# Patient Record
Sex: FEMALE | Race: BLACK OR AFRICAN AMERICAN | NOT HISPANIC OR LATINO | Employment: FULL TIME | URBAN - METROPOLITAN AREA
[De-identification: names, ages, dates, MRNs, and addresses within clinical notes are randomized per-mention and may not be internally consistent; named-entity substitution may affect disease eponyms.]

---

## 2023-02-12 PROBLEM — B37.31 YEAST INFECTION OF THE VAGINA: Status: ACTIVE | Noted: 2023-02-12

## 2023-02-12 PROBLEM — I10 HTN (HYPERTENSION): Status: ACTIVE | Noted: 2023-02-12

## 2023-02-12 PROBLEM — R39.9 UTI SYMPTOMS: Status: ACTIVE | Noted: 2023-02-12

## 2023-02-12 PROBLEM — S16.1XXA CERVICAL STRAIN: Status: ACTIVE | Noted: 2023-02-12

## 2023-02-12 PROBLEM — N76.0 BACTERIAL VAGINOSIS: Status: ACTIVE | Noted: 2023-02-12

## 2023-02-12 PROBLEM — H10.13 ACUTE ALLERGIC CONJUNCTIVITIS OF BOTH EYES: Status: ACTIVE | Noted: 2023-02-12

## 2023-02-12 PROBLEM — R10.2 PELVIC AND PERINEAL PAIN: Status: ACTIVE | Noted: 2023-02-12

## 2023-02-12 PROBLEM — N89.8 VAGINAL ITCHING: Status: ACTIVE | Noted: 2023-02-12

## 2023-02-12 PROBLEM — R63.2 POLYPHAGIA: Status: ACTIVE | Noted: 2023-02-12

## 2023-02-12 PROBLEM — H10.13 ALLERGIC CONJUNCTIVITIS, BILATERAL: Status: ACTIVE | Noted: 2023-02-12

## 2023-02-12 PROBLEM — E66.811 CLASS 1 OBESITY WITHOUT SERIOUS COMORBIDITY WITH BODY MASS INDEX (BMI) OF 34.0 TO 34.9 IN ADULT: Status: ACTIVE | Noted: 2023-02-12

## 2023-02-12 PROBLEM — B96.89 BACTERIAL VAGINOSIS: Status: ACTIVE | Noted: 2023-02-12

## 2023-02-12 PROBLEM — M25.531 RIGHT WRIST PAIN: Status: ACTIVE | Noted: 2023-02-12

## 2023-02-12 PROBLEM — E88.810 METABOLIC SYNDROME: Status: ACTIVE | Noted: 2023-02-12

## 2023-02-12 PROBLEM — N73.0 PID (ACUTE PELVIC INFLAMMATORY DISEASE): Status: ACTIVE | Noted: 2023-02-12

## 2023-02-12 PROBLEM — R39.89 POSSIBLE URINARY TRACT INFECTION: Status: ACTIVE | Noted: 2023-02-12

## 2023-02-12 PROBLEM — M62.89 HIGH-TONE PELVIC FLOOR DYSFUNCTION IN FEMALE: Status: ACTIVE | Noted: 2023-02-12

## 2023-02-12 PROBLEM — N39.0 ACUTE UTI (URINARY TRACT INFECTION): Status: ACTIVE | Noted: 2023-02-12

## 2023-02-12 PROBLEM — H10.33 ACUTE BACTERIAL CONJUNCTIVITIS OF BOTH EYES: Status: ACTIVE | Noted: 2023-02-12

## 2023-02-12 PROBLEM — R20.0 NUMBNESS OF ARM: Status: ACTIVE | Noted: 2023-02-12

## 2023-02-12 PROBLEM — R20.2 RIGHT HAND PARESTHESIA: Status: ACTIVE | Noted: 2023-02-12

## 2023-02-12 PROBLEM — N76.0 RECURRENT VAGINITIS: Status: ACTIVE | Noted: 2023-02-12

## 2023-02-12 PROBLEM — N76.1 SUBACUTE AND CHRONIC VAGINITIS: Status: ACTIVE | Noted: 2023-02-12

## 2023-02-12 PROBLEM — M54.50 LOWER BACK PAIN: Status: ACTIVE | Noted: 2023-02-12

## 2023-02-12 PROBLEM — N89.8 VAGINAL DISCHARGE: Status: ACTIVE | Noted: 2023-02-12

## 2023-02-12 PROBLEM — E66.9 OBESITY: Status: ACTIVE | Noted: 2023-02-12

## 2023-02-12 PROBLEM — E55.9 VITAMIN D DEFICIENCY: Status: ACTIVE | Noted: 2023-02-12

## 2023-02-12 RX ORDER — BUPROPION HYDROCHLORIDE 150 MG/1
TABLET ORAL
COMMUNITY
End: 2023-11-14 | Stop reason: ENTERED-IN-ERROR

## 2023-02-12 RX ORDER — METFORMIN HYDROCHLORIDE 500 MG/1
1500 TABLET ORAL
COMMUNITY
End: 2023-11-14 | Stop reason: SDUPTHER

## 2023-02-12 RX ORDER — ACETAMINOPHEN 500 MG
1 TABLET ORAL DAILY
COMMUNITY
Start: 2022-06-12 | End: 2023-03-30

## 2023-02-12 RX ORDER — AMOXICILLIN 500 MG/1
500 TABLET, FILM COATED ORAL 3 TIMES DAILY
COMMUNITY
End: 2023-03-16 | Stop reason: ALTCHOICE

## 2023-02-12 RX ORDER — AMLODIPINE BESYLATE 10 MG/1
1 TABLET ORAL DAILY
COMMUNITY
Start: 2020-04-23 | End: 2023-08-27

## 2023-02-12 RX ORDER — METHOCARBAMOL 500 MG/1
TABLET, FILM COATED ORAL
COMMUNITY
End: 2023-11-14 | Stop reason: ALTCHOICE

## 2023-02-12 RX ORDER — NORETHINDRONE 5 MG/1
1 TABLET ORAL
COMMUNITY
Start: 2022-02-07 | End: 2023-11-14 | Stop reason: ALTCHOICE

## 2023-02-12 RX ORDER — OFLOXACIN 3 MG/ML
SOLUTION AURICULAR (OTIC)
COMMUNITY
End: 2023-03-16 | Stop reason: ALTCHOICE

## 2023-02-12 RX ORDER — PENICILLIN V POTASSIUM 500 MG/1
500 TABLET, FILM COATED ORAL 4 TIMES DAILY
COMMUNITY
End: 2023-03-16 | Stop reason: ALTCHOICE

## 2023-02-12 RX ORDER — HYDROXYZINE PAMOATE 25 MG/1
CAPSULE ORAL
COMMUNITY
End: 2023-03-16 | Stop reason: ALTCHOICE

## 2023-02-12 RX ORDER — IBUPROFEN 600 MG/1
600 TABLET ORAL EVERY 8 HOURS PRN
COMMUNITY
End: 2023-03-16 | Stop reason: ALTCHOICE

## 2023-02-12 RX ORDER — HYDROCHLOROTHIAZIDE 12.5 MG/1
1 TABLET ORAL DAILY
COMMUNITY
Start: 2020-04-23 | End: 2023-08-27

## 2023-03-16 ENCOUNTER — OFFICE VISIT (OUTPATIENT)
Dept: PRIMARY CARE | Facility: CLINIC | Age: 43
End: 2023-03-16
Payer: COMMERCIAL

## 2023-03-16 VITALS
HEIGHT: 67 IN | BODY MASS INDEX: 32.13 KG/M2 | SYSTOLIC BLOOD PRESSURE: 126 MMHG | DIASTOLIC BLOOD PRESSURE: 78 MMHG | HEART RATE: 71 BPM | OXYGEN SATURATION: 98 % | WEIGHT: 204.7 LBS | TEMPERATURE: 97 F | RESPIRATION RATE: 18 BRPM

## 2023-03-16 DIAGNOSIS — E66.9 CLASS 1 OBESITY WITH BODY MASS INDEX (BMI) OF 32.0 TO 32.9 IN ADULT, UNSPECIFIED OBESITY TYPE, UNSPECIFIED WHETHER SERIOUS COMORBIDITY PRESENT: ICD-10-CM

## 2023-03-16 DIAGNOSIS — Z00.00 ROUTINE HISTORY AND PHYSICAL EXAMINATION OF ADULT: Primary | ICD-10-CM

## 2023-03-16 LAB
ALANINE AMINOTRANSFERASE (SGPT) (U/L) IN SER/PLAS: 21 U/L (ref 7–45)
ALBUMIN (G/DL) IN SER/PLAS: 4.6 G/DL (ref 3.4–5)
ALKALINE PHOSPHATASE (U/L) IN SER/PLAS: 57 U/L (ref 33–110)
ANION GAP IN SER/PLAS: 14 MMOL/L (ref 10–20)
ASPARTATE AMINOTRANSFERASE (SGOT) (U/L) IN SER/PLAS: 21 U/L (ref 9–39)
BILIRUBIN TOTAL (MG/DL) IN SER/PLAS: 0.6 MG/DL (ref 0–1.2)
CALCIDIOL (25 OH VITAMIN D3) (NG/ML) IN SER/PLAS: 46 NG/ML
CALCIUM (MG/DL) IN SER/PLAS: 9.8 MG/DL (ref 8.6–10.6)
CARBON DIOXIDE, TOTAL (MMOL/L) IN SER/PLAS: 29 MMOL/L (ref 21–32)
CHLORIDE (MMOL/L) IN SER/PLAS: 102 MMOL/L (ref 98–107)
CREATININE (MG/DL) IN SER/PLAS: 0.81 MG/DL (ref 0.5–1.05)
GFR FEMALE: >90 ML/MIN/1.73M2
GLUCOSE (MG/DL) IN SER/PLAS: 100 MG/DL (ref 74–99)
HEPATITIS B VIRUS SURFACE AG PRESENCE IN SERUM: NONREACTIVE
HEPATITIS C VIRUS AB PRESENCE IN SERUM: NONREACTIVE
HIV 1/ 2 AG/AB SCREEN: NONREACTIVE
INSULIN, FASTING: 10 UIU/ML (ref 3–25)
POTASSIUM (MMOL/L) IN SER/PLAS: 3.7 MMOL/L (ref 3.5–5.3)
PROTEIN TOTAL: 7.6 G/DL (ref 6.4–8.2)
SODIUM (MMOL/L) IN SER/PLAS: 141 MMOL/L (ref 136–145)
SYPHILIS TOTAL AB: NONREACTIVE
UREA NITROGEN (MG/DL) IN SER/PLAS: 11 MG/DL (ref 6–23)

## 2023-03-16 PROCEDURE — 1036F TOBACCO NON-USER: CPT | Performed by: INTERNAL MEDICINE

## 2023-03-16 PROCEDURE — 99396 PREV VISIT EST AGE 40-64: CPT | Performed by: INTERNAL MEDICINE

## 2023-03-16 PROCEDURE — 3008F BODY MASS INDEX DOCD: CPT | Performed by: INTERNAL MEDICINE

## 2023-03-16 PROCEDURE — 3074F SYST BP LT 130 MM HG: CPT | Performed by: INTERNAL MEDICINE

## 2023-03-16 PROCEDURE — 3078F DIAST BP <80 MM HG: CPT | Performed by: INTERNAL MEDICINE

## 2023-03-16 RX ORDER — OXYCODONE AND ACETAMINOPHEN 5; 325 MG/1; MG/1
TABLET ORAL EVERY 6 HOURS
COMMUNITY
Start: 2016-08-24 | End: 2023-03-16 | Stop reason: ALTCHOICE

## 2023-03-16 ASSESSMENT — ENCOUNTER SYMPTOMS
DIZZINESS: 0
HEADACHES: 0
NUMBNESS: 0
ABDOMINAL PAIN: 0
SORE THROAT: 0
BACK PAIN: 1
CONSTITUTIONAL NEGATIVE: 1
WEAKNESS: 0
WHEEZING: 0
ARTHRALGIAS: 0
DIFFICULTY URINATING: 0
ADENOPATHY: 0
PALPITATIONS: 0
RHINORRHEA: 0
VOMITING: 0
SHORTNESS OF BREATH: 0
JOINT SWELLING: 0
EYES NEGATIVE: 1
BLOOD IN STOOL: 0
CONSTIPATION: 0
BRUISES/BLEEDS EASILY: 0
PSYCHIATRIC NEGATIVE: 1
WOUND: 0
CHEST TIGHTNESS: 0
DYSURIA: 0
NAUSEA: 0
COUGH: 0
POLYPHAGIA: 0
POLYDIPSIA: 0
FREQUENCY: 0

## 2023-03-16 NOTE — PATIENT INSTRUCTIONS
You were seen today for a physical.  Your vital signs are in the normal range.  Fasting labs were ordered today as part of your evaluation.  Continue to exercise regularly as tolerated, eat a heart healthy diet and resume weight reduction.  It was a pleasure seeing you today.

## 2023-03-16 NOTE — PROGRESS NOTES
Subjective   Patient ID: Yenni Suero is a 42 y.o. female who presents for Annual Exam.  The patient is a 41 YO female who is being seen today for a physical.      Review of Systems   Constitutional: Negative.    HENT: Negative.  Negative for ear pain, hearing loss, rhinorrhea, sneezing and sore throat.    Eyes: Negative.    Respiratory:  Negative for cough, chest tightness, shortness of breath and wheezing.    Cardiovascular:  Negative for chest pain, palpitations and leg swelling.   Gastrointestinal:  Negative for abdominal pain, blood in stool, constipation, nausea and vomiting.   Endocrine: Negative for polydipsia, polyphagia and polyuria.   Genitourinary:  Negative for difficulty urinating, dysuria, frequency, vaginal bleeding, vaginal discharge and vaginal pain.   Musculoskeletal:  Positive for back pain (Hx of chronic back pain). Negative for arthralgias and joint swelling.   Skin:  Positive for rash (Eczema of legs). Negative for wound.   Allergic/Immunologic: Positive for food allergies. Negative for environmental allergies.   Neurological:  Negative for dizziness, syncope, weakness, numbness and headaches.   Hematological:  Negative for adenopathy. Does not bruise/bleed easily.   Psychiatric/Behavioral: Negative.         Objective   Physical Exam  Vitals reviewed.   Constitutional:       General: She is not in acute distress.     Appearance: Normal appearance. She is obese.   HENT:      Head: Normocephalic.      Right Ear: Tympanic membrane, ear canal and external ear normal.      Left Ear: Tympanic membrane, ear canal and external ear normal.      Nose: Nose normal. No rhinorrhea.      Mouth/Throat:      Mouth: Mucous membranes are moist.      Pharynx: Oropharynx is clear. No oropharyngeal exudate or posterior oropharyngeal erythema.   Eyes:      General: Scleral icterus present.      Extraocular Movements: Extraocular movements intact.      Conjunctiva/sclera: Conjunctivae normal.      Pupils:  Pupils are equal, round, and reactive to light.   Cardiovascular:      Rate and Rhythm: Normal rate and regular rhythm.      Heart sounds: Normal heart sounds. No murmur heard.     No friction rub. No gallop.   Pulmonary:      Effort: Pulmonary effort is normal.      Breath sounds: Normal breath sounds.   Abdominal:      General: Bowel sounds are normal. There is no distension.      Palpations: Abdomen is soft. There is no mass.      Tenderness: There is no abdominal tenderness.   Musculoskeletal:      Cervical back: No rigidity or tenderness.   Lymphadenopathy:      Cervical: No cervical adenopathy.   Skin:     General: Skin is warm and dry.      Findings: No erythema or rash.   Neurological:      General: No focal deficit present.      Mental Status: She is alert and oriented to person, place, and time.      Sensory: No sensory deficit.      Motor: No weakness.   Psychiatric:         Mood and Affect: Mood normal.         Behavior: Behavior normal.       Assessment/Plan   Problem List Items Addressed This Visit    None  Visit Diagnoses       Routine history and physical examination of adult    -  Primary    Relevant Orders    Lipid Panel    CBC    Class 1 obesity with body mass index (BMI) of 32.0 to 32.9 in adult, unspecified obesity type, unspecified whether serious comorbidity present        Relevant Orders    Hemoglobin A1C    Follow Up In [facility] - PCP

## 2023-03-21 ENCOUNTER — LAB (OUTPATIENT)
Dept: LAB | Facility: LAB | Age: 43
End: 2023-03-21
Payer: COMMERCIAL

## 2023-03-21 ENCOUNTER — OFFICE VISIT (OUTPATIENT)
Dept: PRIMARY CARE | Facility: CLINIC | Age: 43
End: 2023-03-21
Payer: COMMERCIAL

## 2023-03-21 VITALS
HEART RATE: 64 BPM | WEIGHT: 207.8 LBS | DIASTOLIC BLOOD PRESSURE: 91 MMHG | BODY MASS INDEX: 32.62 KG/M2 | HEIGHT: 67 IN | OXYGEN SATURATION: 98 % | TEMPERATURE: 97.2 F | SYSTOLIC BLOOD PRESSURE: 148 MMHG

## 2023-03-21 DIAGNOSIS — E66.9 CLASS 1 OBESITY WITH BODY MASS INDEX (BMI) OF 32.0 TO 32.9 IN ADULT, UNSPECIFIED OBESITY TYPE, UNSPECIFIED WHETHER SERIOUS COMORBIDITY PRESENT: ICD-10-CM

## 2023-03-21 DIAGNOSIS — Z00.00 ROUTINE HISTORY AND PHYSICAL EXAMINATION OF ADULT: ICD-10-CM

## 2023-03-21 DIAGNOSIS — B37.31 YEAST INFECTION OF THE VAGINA: Primary | ICD-10-CM

## 2023-03-21 DIAGNOSIS — M54.50 LUMBAR BACK PAIN: ICD-10-CM

## 2023-03-21 LAB
CHOLESTEROL (MG/DL) IN SER/PLAS: 148 MG/DL (ref 0–199)
CHOLESTEROL IN HDL (MG/DL) IN SER/PLAS: 55.5 MG/DL
CHOLESTEROL/HDL RATIO: 2.7
ERYTHROCYTE DISTRIBUTION WIDTH (RATIO) BY AUTOMATED COUNT: 11.9 % (ref 11.5–14.5)
ERYTHROCYTE MEAN CORPUSCULAR HEMOGLOBIN CONCENTRATION (G/DL) BY AUTOMATED: 33.1 G/DL (ref 32–36)
ERYTHROCYTE MEAN CORPUSCULAR VOLUME (FL) BY AUTOMATED COUNT: 91 FL (ref 80–100)
ERYTHROCYTES (10*6/UL) IN BLOOD BY AUTOMATED COUNT: 4.47 X10E12/L (ref 4–5.2)
ESTIMATED AVERAGE GLUCOSE FOR HBA1C: 105 MG/DL
HEMATOCRIT (%) IN BLOOD BY AUTOMATED COUNT: 40.5 % (ref 36–46)
HEMOGLOBIN (G/DL) IN BLOOD: 13.4 G/DL (ref 12–16)
HEMOGLOBIN A1C/HEMOGLOBIN TOTAL IN BLOOD: 5.3 %
LDL: 75 MG/DL (ref 0–99)
LEUKOCYTES (10*3/UL) IN BLOOD BY AUTOMATED COUNT: 7.2 X10E9/L (ref 4.4–11.3)
NRBC (PER 100 WBCS) BY AUTOMATED COUNT: 0 /100 WBC (ref 0–0)
PLATELETS (10*3/UL) IN BLOOD AUTOMATED COUNT: 358 X10E9/L (ref 150–450)
TRIGLYCERIDE (MG/DL) IN SER/PLAS: 86 MG/DL (ref 0–149)
VLDL: 17 MG/DL (ref 0–40)

## 2023-03-21 PROCEDURE — 80061 LIPID PANEL: CPT

## 2023-03-21 PROCEDURE — 85027 COMPLETE CBC AUTOMATED: CPT

## 2023-03-21 PROCEDURE — 1036F TOBACCO NON-USER: CPT | Performed by: STUDENT IN AN ORGANIZED HEALTH CARE EDUCATION/TRAINING PROGRAM

## 2023-03-21 PROCEDURE — 36415 COLL VENOUS BLD VENIPUNCTURE: CPT

## 2023-03-21 PROCEDURE — 99214 OFFICE O/P EST MOD 30 MIN: CPT | Performed by: STUDENT IN AN ORGANIZED HEALTH CARE EDUCATION/TRAINING PROGRAM

## 2023-03-21 PROCEDURE — 3008F BODY MASS INDEX DOCD: CPT | Performed by: STUDENT IN AN ORGANIZED HEALTH CARE EDUCATION/TRAINING PROGRAM

## 2023-03-21 PROCEDURE — 83036 HEMOGLOBIN GLYCOSYLATED A1C: CPT

## 2023-03-21 PROCEDURE — 3077F SYST BP >= 140 MM HG: CPT | Performed by: STUDENT IN AN ORGANIZED HEALTH CARE EDUCATION/TRAINING PROGRAM

## 2023-03-21 PROCEDURE — 3080F DIAST BP >= 90 MM HG: CPT | Performed by: STUDENT IN AN ORGANIZED HEALTH CARE EDUCATION/TRAINING PROGRAM

## 2023-03-21 RX ORDER — FLUCONAZOLE 150 MG/1
150 TABLET ORAL ONCE
Qty: 1 TABLET | Refills: 0 | Status: SHIPPED | OUTPATIENT
Start: 2023-03-21 | End: 2023-03-21

## 2023-03-21 RX ORDER — GABAPENTIN 100 MG/1
100 CAPSULE ORAL NIGHTLY
Qty: 90 CAPSULE | Refills: 0 | Status: SHIPPED | OUTPATIENT
Start: 2023-03-21 | End: 2023-11-14 | Stop reason: ALTCHOICE

## 2023-03-21 ASSESSMENT — ENCOUNTER SYMPTOMS
BACK PAIN: 1
PARESIS: 0
BOWEL INCONTINENCE: 0
PARESTHESIAS: 0
TINGLING: 1
NUMBNESS: 0

## 2023-03-21 NOTE — PROGRESS NOTES
Subjective   Patient ID: Yenni Suero is a 42 y.o. female who presents for Follow-up (Back pain ) and Vaginitis/Bacterial Vaginosis (Recent abx use ).  Back Pain  This is a chronic problem. The current episode started more than 1 year ago. The problem occurs daily. The problem has been gradually worsening (for the past 3 weeks getting worse) since onset. The pain is present in the gluteal and sacro-iliac. The pain is severe. Worse during: worse when wakes up in the AM. The symptoms are aggravated by lying down. Associated symptoms include tingling (fingers and toes for the past year). Pertinent negatives include no bladder incontinence, bowel incontinence, numbness, paresis or paresthesias. She has tried NSAIDs, heat and analgesics for the symptoms. The treatment provided no relief.   Vaginal Itching  The patient's primary symptoms include genital itching and vaginal discharge (white). The patient's pertinent negatives include no genital lesions or genital odor. This is a new problem. The current episode started in the past 7 days. Associated symptoms include back pain. The vaginal discharge was thick and white. The vaginal bleeding is spotting. She has not been passing clots. She has not been passing tissue. She is not sexually active. Her past medical history is significant for endometriosis.       Review of Systems   Gastrointestinal:  Negative for bowel incontinence.   Genitourinary:  Positive for vaginal discharge (white). Negative for bladder incontinence.   Musculoskeletal:  Positive for back pain.   Neurological:  Positive for tingling (fingers and toes for the past year). Negative for numbness and paresthesias.       Visit Vitals  BP (!) 148/91   Pulse 64   Temp 36.2 °C (97.2 °F)          Objective   Physical Exam  Constitutional:       General: She is not in acute distress.     Appearance: Normal appearance.   HENT:      Head: Normocephalic and atraumatic.   Eyes:      General: No scleral icterus.      Conjunctiva/sclera: Conjunctivae normal.   Cardiovascular:      Rate and Rhythm: Normal rate and regular rhythm.      Heart sounds: Normal heart sounds.   Pulmonary:      Effort: Pulmonary effort is normal.      Breath sounds: Normal breath sounds. No wheezing.   Abdominal:      General: Bowel sounds are normal. There is no distension.      Palpations: Abdomen is soft.      Tenderness: There is no abdominal tenderness.   Musculoskeletal:         General: Tenderness (R lumbosacral area) present.      Cervical back: Neck supple.      Right lower leg: No edema.      Left lower leg: No edema.   Lymphadenopathy:      Cervical: No cervical adenopathy.   Skin:     General: Skin is warm and dry.   Neurological:      General: No focal deficit present.      Mental Status: She is alert and oriented to person, place, and time.   Psychiatric:         Mood and Affect: Mood normal.         Behavior: Behavior normal.         Assessment/Plan   Problem List Items Addressed This Visit          Genitourinary    Yeast infection of the vagina - Primary     Diflucan   See your OBGYN          Relevant Medications    fluconazole (Diflucan) 150 mg tablet       Other    Lumbar back pain     X-ray of lumbar spine  Gabapentin started 100 mg at bedtime may increase to 200 mg at bedtime after 3 to 4 days if the pain not controlled.  Can take up to 300 mg nightly  Referral to physical therapy         Relevant Medications    gabapentin (Neurontin) 100 mg capsule    Other Relevant Orders    Referral to Physical Therapy    XR lumbar spine complete 4+ views    Follow Up In [facility] - PCP

## 2023-03-21 NOTE — PATIENT INSTRUCTIONS
Continue with current medications.  X-ray of lumbar spine  Gabapentin started 100 mg at bedtime may increase to 200 mg at bedtime after 3 to 4 days if the pain not controlled.  Can take up to 300 mg nightly  Referral to physical therapy  Referral to pain management if no improvement  All the nonurgent lab results will be discussed with you at your next office visit.  Please arrive 15 minutes before your appointment.   Return to office in 1 months with PCP for back pain

## 2023-03-21 NOTE — ASSESSMENT & PLAN NOTE
X-ray of lumbar spine  Gabapentin started 100 mg at bedtime may increase to 200 mg at bedtime after 3 to 4 days if the pain not controlled.  Can take up to 300 mg nightly  Referral to physical therapy

## 2023-03-29 DIAGNOSIS — E55.9 VITAMIN D DEFICIENCY, UNSPECIFIED: ICD-10-CM

## 2023-03-30 ENCOUNTER — TELEPHONE (OUTPATIENT)
Dept: PRIMARY CARE | Facility: CLINIC | Age: 43
End: 2023-03-30
Payer: COMMERCIAL

## 2023-03-30 RX ORDER — ACETAMINOPHEN 500 MG
TABLET ORAL
Qty: 90 CAPSULE | Refills: 2 | Status: SHIPPED | OUTPATIENT
Start: 2023-03-30

## 2023-03-30 NOTE — TELEPHONE ENCOUNTER
----- Message from Peyton Dobson MD sent at 3/27/2023  4:52 PM EDT -----  Please call the patient regarding her abnormal result.  X-ray of the back showed some degenerative joint disease in the lower spine.  I had previously referred her to physical therapy.  If she is not getting any relief with physical therapy we can always refer her to pain management or physical medicine and rehab .

## 2023-06-22 LAB
CHLAMYDIA TRACH., AMPLIFIED: NEGATIVE
CLUE CELLS: ABNORMAL
N. GONORRHEA, AMPLIFIED: NEGATIVE
NUGENT SCORE: 0
TRICHOMONAS VAGINALIS: NEGATIVE
YEAST: PRESENT

## 2023-08-20 DIAGNOSIS — Z91.018 FOOD ALLERGY: Primary | ICD-10-CM

## 2023-08-20 RX ORDER — EPINEPHRINE 0.15 MG/.15ML
0.15 INJECTION SUBCUTANEOUS ONCE AS NEEDED
Qty: 1 EACH | Refills: 2 | Status: SHIPPED | OUTPATIENT
Start: 2023-08-20 | End: 2023-08-23

## 2023-08-21 DIAGNOSIS — Z91.018 FOOD ALLERGY: ICD-10-CM

## 2023-08-21 DIAGNOSIS — I10 HYPERTENSION, UNSPECIFIED TYPE: Primary | ICD-10-CM

## 2023-08-21 NOTE — TELEPHONE ENCOUNTER
Leo from [facility] stated the dose for the Epipen is for children. Please, prescribe adult Epipen. Leo stated Dr. Dobson is the prescribing doctor.

## 2023-08-23 RX ORDER — EPINEPHRINE 0.15 MG/.15ML
0.15 INJECTION SUBCUTANEOUS ONCE AS NEEDED
Qty: 2 EACH | Refills: 2 | Status: SHIPPED | OUTPATIENT
Start: 2023-08-23 | End: 2023-08-27

## 2023-08-24 DIAGNOSIS — Z91.018 FOOD ALLERGY: ICD-10-CM

## 2023-08-27 RX ORDER — EPINEPHRINE 0.15 MG/.15ML
0.15 INJECTION SUBCUTANEOUS ONCE AS NEEDED
Qty: 2 EACH | Refills: 2 | Status: SHIPPED | OUTPATIENT
Start: 2023-08-27 | End: 2024-03-25 | Stop reason: WASHOUT

## 2023-08-27 RX ORDER — AMLODIPINE BESYLATE 10 MG/1
10 TABLET ORAL DAILY
Qty: 90 TABLET | Refills: 1 | Status: SHIPPED | OUTPATIENT
Start: 2023-08-27

## 2023-08-27 RX ORDER — HYDROCHLOROTHIAZIDE 12.5 MG/1
12.5 TABLET ORAL DAILY
Qty: 90 TABLET | Refills: 1 | Status: SHIPPED | OUTPATIENT
Start: 2023-08-27

## 2023-11-14 ENCOUNTER — OFFICE VISIT (OUTPATIENT)
Dept: PRIMARY CARE | Facility: CLINIC | Age: 43
End: 2023-11-14
Payer: COMMERCIAL

## 2023-11-14 VITALS
TEMPERATURE: 94.3 F | OXYGEN SATURATION: 100 % | DIASTOLIC BLOOD PRESSURE: 80 MMHG | SYSTOLIC BLOOD PRESSURE: 118 MMHG | WEIGHT: 199.2 LBS | RESPIRATION RATE: 14 BRPM | HEART RATE: 98 BPM | HEIGHT: 67 IN | BODY MASS INDEX: 31.27 KG/M2

## 2023-11-14 DIAGNOSIS — Z13.39 ATTENTION DEFICIT HYPERACTIVITY DISORDER (ADHD) EVALUATION: Primary | ICD-10-CM

## 2023-11-14 DIAGNOSIS — E66.9 CLASS 1 OBESITY WITH SERIOUS COMORBIDITY AND BODY MASS INDEX (BMI) OF 31.0 TO 31.9 IN ADULT, UNSPECIFIED OBESITY TYPE: ICD-10-CM

## 2023-11-14 DIAGNOSIS — Z12.11 SCREENING FOR COLORECTAL CANCER: ICD-10-CM

## 2023-11-14 DIAGNOSIS — Z12.12 SCREENING FOR COLORECTAL CANCER: ICD-10-CM

## 2023-11-14 DIAGNOSIS — E88.810 METABOLIC SYNDROME: ICD-10-CM

## 2023-11-14 DIAGNOSIS — R63.2 POLYPHAGIA: ICD-10-CM

## 2023-11-14 PROBLEM — N89.8 VAGINAL ITCHING: Status: RESOLVED | Noted: 2023-02-12 | Resolved: 2023-11-14

## 2023-11-14 PROBLEM — R39.9 UTI SYMPTOMS: Status: RESOLVED | Noted: 2023-02-12 | Resolved: 2023-11-14

## 2023-11-14 PROBLEM — R39.89 POSSIBLE URINARY TRACT INFECTION: Status: RESOLVED | Noted: 2023-02-12 | Resolved: 2023-11-14

## 2023-11-14 PROCEDURE — 99214 OFFICE O/P EST MOD 30 MIN: CPT | Performed by: STUDENT IN AN ORGANIZED HEALTH CARE EDUCATION/TRAINING PROGRAM

## 2023-11-14 PROCEDURE — 3079F DIAST BP 80-89 MM HG: CPT | Performed by: STUDENT IN AN ORGANIZED HEALTH CARE EDUCATION/TRAINING PROGRAM

## 2023-11-14 PROCEDURE — 1036F TOBACCO NON-USER: CPT | Performed by: STUDENT IN AN ORGANIZED HEALTH CARE EDUCATION/TRAINING PROGRAM

## 2023-11-14 PROCEDURE — 3074F SYST BP LT 130 MM HG: CPT | Performed by: STUDENT IN AN ORGANIZED HEALTH CARE EDUCATION/TRAINING PROGRAM

## 2023-11-14 PROCEDURE — 3008F BODY MASS INDEX DOCD: CPT | Performed by: STUDENT IN AN ORGANIZED HEALTH CARE EDUCATION/TRAINING PROGRAM

## 2023-11-14 RX ORDER — METFORMIN HYDROCHLORIDE 500 MG/1
1500 TABLET ORAL
Status: CANCELLED | OUTPATIENT
Start: 2023-11-14

## 2023-11-14 RX ORDER — BUPROPION HYDROCHLORIDE 300 MG/1
300 TABLET ORAL DAILY
COMMUNITY

## 2023-11-14 RX ORDER — METFORMIN HYDROCHLORIDE 500 MG/1
1500 TABLET ORAL
Qty: 270 TABLET | Refills: 1 | Status: SHIPPED | OUTPATIENT
Start: 2023-11-14 | End: 2024-05-12

## 2023-11-14 RX ORDER — ELAGOLIX 150 MG/1
150 TABLET, FILM COATED ORAL DAILY
COMMUNITY
Start: 2023-08-22

## 2023-11-14 ASSESSMENT — PATIENT HEALTH QUESTIONNAIRE - PHQ9
SUM OF ALL RESPONSES TO PHQ9 QUESTIONS 1 & 2: 0
1. LITTLE INTEREST OR PLEASURE IN DOING THINGS: NOT AT ALL
2. FEELING DOWN, DEPRESSED OR HOPELESS: NOT AT ALL

## 2023-11-14 NOTE — PATIENT INSTRUCTIONS
BMI was above normal measurement. Current weight: 90.4 kg (199 lb 3.2 oz)  Weight change since last visit (-) denotes wt loss -6.8 lbs   Weight loss needed to achieve BMI 25: 39.9 Lbs  Weight loss needed to achieve BMI 30: 8.1 Lbs  Provided instructions on dietary changes  Provided instructions on exercise  Advised to Increase physical activity.  Blood work today  Referral to psychiatry to Attention Deficit evaluation   Please follow up in 3-4 months or as needed

## 2023-11-14 NOTE — PROGRESS NOTES
Subjective   Patient ID: Yenni Suero is a pleasant 43 y.o. female who presents for concentration .  HPI  She goes to school and she has trouble with concentration.   Has been having this problem for her whole life   Never checked for ADD or ADHD  Also has noted difficulty with concentration with tasks that are not related to her schoolwork.    She was previously seeing a weight loss  at [facility] and was started on metformin and Mounjaro.  Noted some weight loss with Mounjaro.  Reports that as her insurance changed it was no longer covered with her insurance.  We had a long discussion with regards to lifestyle modification, intermittent fasting, exercise, monitoring her diet and adhering to a low calorie diet.  In the meantime we will complete blood work and to check for A1c, insulin level and CMP.    Review of Systems   All other systems reviewed and are negative.      Visit Vitals  /80   Pulse 98   Temp 34.6 °C (94.3 °F)   Resp 14          Objective   Physical Exam  Constitutional:       General: She is not in acute distress.     Appearance: Normal appearance.   HENT:      Head: Normocephalic and atraumatic.   Eyes:      General: No scleral icterus.     Conjunctiva/sclera: Conjunctivae normal.   Cardiovascular:      Rate and Rhythm: Normal rate and regular rhythm.      Heart sounds: Normal heart sounds.   Pulmonary:      Effort: Pulmonary effort is normal.      Breath sounds: Normal breath sounds. No wheezing.   Abdominal:      General: Bowel sounds are normal. There is no distension.      Palpations: Abdomen is soft.      Tenderness: There is no abdominal tenderness.   Musculoskeletal:      Cervical back: Neck supple.      Right lower leg: No edema.      Left lower leg: No edema.   Lymphadenopathy:      Cervical: No cervical adenopathy.   Skin:     General: Skin is warm and dry.   Neurological:      General: No focal deficit present.      Mental Status: She is alert and  oriented to person, place, and time.   Psychiatric:         Mood and Affect: Mood normal.         Behavior: Behavior normal.         Assessment/Plan   Problem List Items Addressed This Visit       Metabolic syndrome    Relevant Medications    metFORMIN (Glucophage) 500 mg tablet    Other Relevant Orders    Hemoglobin A1C    Insulin, Random    Comprehensive Metabolic Panel    Polyphagia    Relevant Orders    Hemoglobin A1C    Insulin, Random    Comprehensive Metabolic Panel     Other Visit Diagnoses       Attention deficit hyperactivity disorder (ADHD) evaluation    -  Primary    Relevant Orders    Referral to Psychiatry    Class 1 obesity with serious comorbidity and body mass index (BMI) of 31.0 to 31.9 in adult, unspecified obesity type        Relevant Orders    Hemoglobin A1C    Insulin, Random    Comprehensive Metabolic Panel    Screening for colorectal cancer        Relevant Orders    Colonoscopy Screening; Average Risk Patient

## 2024-01-02 ENCOUNTER — APPOINTMENT (OUTPATIENT)
Dept: OBSTETRICS AND GYNECOLOGY | Facility: CLINIC | Age: 44
End: 2024-01-02
Payer: COMMERCIAL

## 2024-01-03 ENCOUNTER — OFFICE VISIT (OUTPATIENT)
Dept: OBSTETRICS AND GYNECOLOGY | Facility: CLINIC | Age: 44
End: 2024-01-03
Payer: COMMERCIAL

## 2024-01-03 VITALS
BODY MASS INDEX: 31.79 KG/M2 | HEART RATE: 76 BPM | DIASTOLIC BLOOD PRESSURE: 92 MMHG | WEIGHT: 203 LBS | SYSTOLIC BLOOD PRESSURE: 130 MMHG

## 2024-01-03 DIAGNOSIS — N76.0 BACTERIAL VAGINITIS: Primary | ICD-10-CM

## 2024-01-03 DIAGNOSIS — B96.89 BACTERIAL VAGINOSIS: Primary | ICD-10-CM

## 2024-01-03 DIAGNOSIS — B37.31 VAGINAL YEAST INFECTION: ICD-10-CM

## 2024-01-03 DIAGNOSIS — Z11.3 ROUTINE SCREENING FOR STI (SEXUALLY TRANSMITTED INFECTION): ICD-10-CM

## 2024-01-03 DIAGNOSIS — B96.89 BACTERIAL VAGINITIS: Primary | ICD-10-CM

## 2024-01-03 DIAGNOSIS — N89.8 VAGINAL DISCHARGE: ICD-10-CM

## 2024-01-03 DIAGNOSIS — N76.0 BACTERIAL VAGINOSIS: Primary | ICD-10-CM

## 2024-01-03 LAB
CLUE CELLS VAG LPF-#/AREA: PRESENT /[LPF]
NUGENT SCORE: 8
YEAST VAG WET PREP-#/AREA: ABNORMAL

## 2024-01-03 PROCEDURE — 3075F SYST BP GE 130 - 139MM HG: CPT

## 2024-01-03 PROCEDURE — 99213 OFFICE O/P EST LOW 20 MIN: CPT

## 2024-01-03 PROCEDURE — 3080F DIAST BP >= 90 MM HG: CPT

## 2024-01-03 PROCEDURE — 87205 SMEAR GRAM STAIN: CPT

## 2024-01-03 PROCEDURE — 87661 TRICHOMONAS VAGINALIS AMPLIF: CPT | Mod: 59

## 2024-01-03 PROCEDURE — 3008F BODY MASS INDEX DOCD: CPT

## 2024-01-03 PROCEDURE — 1036F TOBACCO NON-USER: CPT

## 2024-01-03 PROCEDURE — 87800 DETECT AGNT MULT DNA DIREC: CPT

## 2024-01-03 RX ORDER — SECNIDAZOLE 2 G/4.8G
1 GRANULE ORAL ONCE
Qty: 1 EACH | Refills: 0 | Status: SHIPPED | OUTPATIENT
Start: 2024-01-03 | End: 2024-01-05

## 2024-01-03 ASSESSMENT — PAIN SCALES - GENERAL: PAINLEVEL: 7

## 2024-01-03 ASSESSMENT — ENCOUNTER SYMPTOMS
CONSTITUTIONAL NEGATIVE: 0
GASTROINTESTINAL NEGATIVE: 0
HEMATOLOGIC/LYMPHATIC NEGATIVE: 0
CARDIOVASCULAR NEGATIVE: 0
EYES NEGATIVE: 0
MUSCULOSKELETAL NEGATIVE: 0
PSYCHIATRIC NEGATIVE: 0
NEUROLOGICAL NEGATIVE: 0
ENDOCRINE NEGATIVE: 0
RESPIRATORY NEGATIVE: 0
ALLERGIC/IMMUNOLOGIC NEGATIVE: 0

## 2024-01-03 NOTE — PROGRESS NOTES
Assessment/Plan   Diagnoses and all orders for this visit:  Bacterial vaginosis  Routine screening for STI (sexually transmitted infection)  -     HIV 1/2 Antigen/Antibody Screen with Reflex to Confirmation; Future  -     Syphilis Screen with Reflex; Future  -     Hepatitis C Antibody; Future  -     Hepatitis B Surface Antigen; Future  -     Trichomonas vaginalis, Nucleic Acid Detection  -     C. Trachomatis / N. Gonorrhoeae, Amplified Detection  Vaginal discharge  -     Vaginitis Gram Stain For Bacterial Vaginosis + Yeast    Will treat for BV based on presenting s/s. Pt requested Solosec packet as treatment. Will switch treatment if ins does not cover or if swab comes back positive for other diagnosis.      Jeanna Askew, APRN-CNM    Subjective   Yenni Suero is a 43 y.o. female who complains of vaginal discomfort.    HPI:  Symptoms reported: milky white discharge, odor, pain, and pelvic and back pain  Onset:  vaginal dc started 2 week ago with pelvic pain and back pain about 2 days ago  Course: constant  Progression: worsened    Helpful treatments:  nothing   Unhelpful treatments tried:  refresh, diflucan     Sexual history reviewed with the patient.   STI exposures include none.   Patient reports previous history of the following STIs: trichomonas.  Desires STI screening today     Objective   Physical Exam:   Physical Exam  Constitutional:       General: She is not in acute distress.     Appearance: Normal appearance. She is normal weight. She is not ill-appearing.   Genitourinary:      Vulva and rectum normal.      No lesions in the vagina.      Right Labia: No rash, tenderness, lesions or skin changes.     Left Labia: No tenderness, lesions, skin changes or rash.     No labial fusion noted.      Vaginal discharge present.      No vaginal erythema, tenderness, bleeding or ulceration.   Breasts:     Breasts are symmetrical.      Breasts are soft.     Right: Normal.      Left: Normal.   Eyes:      General:          Right eye: No discharge.         Left eye: No discharge.   Pulmonary:      Effort: Pulmonary effort is normal. No respiratory distress.   Abdominal:      General: Abdomen is flat.      Palpations: Abdomen is soft.      Tenderness: There is no right CVA tenderness or left CVA tenderness.   Musculoskeletal:         General: No swelling, tenderness, deformity or signs of injury.      Cervical back: No rigidity or tenderness.   Neurological:      General: No focal deficit present.      Mental Status: She is alert and oriented to person, place, and time. Mental status is at baseline.   Skin:     General: Skin is warm and dry.   Psychiatric:         Mood and Affect: Mood normal.         Behavior: Behavior normal.         Thought Content: Thought content normal.         Judgment: Judgment normal.

## 2024-01-04 LAB
C TRACH RRNA SPEC QL NAA+PROBE: NEGATIVE
N GONORRHOEA DNA SPEC QL PROBE+SIG AMP: NEGATIVE
T VAGINALIS RRNA SPEC QL NAA+PROBE: NEGATIVE

## 2024-01-04 RX ORDER — FLUCONAZOLE 150 MG/1
150 TABLET ORAL ONCE
Qty: 1 TABLET | Refills: 0 | Status: SHIPPED | OUTPATIENT
Start: 2024-01-04 | End: 2024-01-04

## 2024-01-04 RX ORDER — CLINDAMYCIN PHOSPHATE 20 MG/G
1 CREAM VAGINAL NIGHTLY
Qty: 40 G | Refills: 0 | Status: SHIPPED | OUTPATIENT
Start: 2024-01-04 | End: 2024-01-11

## 2024-02-05 ENCOUNTER — APPOINTMENT (OUTPATIENT)
Dept: ORTHOPEDIC SURGERY | Facility: CLINIC | Age: 44
End: 2024-02-05
Payer: COMMERCIAL

## 2024-02-07 ENCOUNTER — OFFICE VISIT (OUTPATIENT)
Dept: ORTHOPEDIC SURGERY | Facility: CLINIC | Age: 44
End: 2024-02-07
Payer: COMMERCIAL

## 2024-02-07 DIAGNOSIS — M54.16 LUMBAR RADICULITIS: ICD-10-CM

## 2024-02-07 PROCEDURE — 3008F BODY MASS INDEX DOCD: CPT | Performed by: PHYSICIAN ASSISTANT

## 2024-02-07 PROCEDURE — 1036F TOBACCO NON-USER: CPT | Performed by: PHYSICIAN ASSISTANT

## 2024-02-07 PROCEDURE — 99203 OFFICE O/P NEW LOW 30 MIN: CPT | Performed by: PHYSICIAN ASSISTANT

## 2024-02-07 RX ORDER — CELECOXIB 100 MG/1
100 CAPSULE ORAL 2 TIMES DAILY
Qty: 60 CAPSULE | Refills: 1 | Status: SHIPPED | OUTPATIENT
Start: 2024-02-07 | End: 2024-04-29

## 2024-02-07 ASSESSMENT — PAIN SCALES - GENERAL: PAINLEVEL_OUTOF10: 4

## 2024-02-07 ASSESSMENT — PAIN - FUNCTIONAL ASSESSMENT: PAIN_FUNCTIONAL_ASSESSMENT: 0-10

## 2024-02-07 NOTE — PROGRESS NOTES
Yenni is a 43-year-old female that presents today for follow-up.  She previously saw Dr. Ksenia Coronel last year .  Patient states her symptoms are very similar, her most recent exacerbation happened in January.    She denies any injury or fall.  She states that she was working out with weightlifting and treadmill when she noticed a recent exacerbation that started in January.  She describes it is localized left-sided low back, SI with symptoms going into her glutes.  She describes it as more of an achy, shooting pain that will wax and wane throughout the day.  Thankfully no numbness or tingling, no symptoms radiating distally from the glue in that left side.  Right lower extremity is asymptomatic.  No hip or groin pain.  Full control of her bowel and bladder.  She ambulates without any assistance or difficulty.    From a treatment standpoint patient has been using old gabapentin, muscle relaxer and anti-inflammatories that provided very minimal relief.  No formal PT has been done.    Family, social, and medical histories are obtained and reviewed.    I reviewed the complete 30-point review of systems that was documented on the scanned patient intake form.  All other systems are non-contributory except as defined in history of present illness.    Const: Well-appearing, well-nourished female in no distress.  Eyes: Normal appearing sclera and conjunctiva, no jaundice, pupils normal in appearance.  Resp: breathing comfortably, normal respiratory rate.  CV: No upper or lower extremity edema.  Musculoskeletal: Normal gait.  Able to heel/toe walk without difficulty.  Lumbar ROM is supple.  Strength exam of the lower extremities reveals 5/5 strength in all major muscle groups .  Negative straight leg raise bilaterally.  Neuro: Sensation is intact and equal bilaterally. Deep tendon reflexes are normal and symmetric.  No clonus.  Skin: Intact without any lesions, normal turgor.  Psych: Alert and oriented x3, normal mood  and affect.    Moving forward, I discussed with the patient that we will definitely work this up conservatively.  She has tried meloxicam and other anti-inflammatories with minimal relief, we discussed starting her on Celebrex which we will send over to her pharmacy for her.  We are also going to treat this conservatively with physical therapy and she will follow-up with us for clinical recheck in 6 weeks.    This note was dictated using speech recognition software and was not corrected for spelling or grammatical errors

## 2024-03-01 ENCOUNTER — APPOINTMENT (OUTPATIENT)
Dept: PRIMARY CARE | Facility: CLINIC | Age: 44
End: 2024-03-01
Payer: COMMERCIAL

## 2024-03-25 ENCOUNTER — LAB (OUTPATIENT)
Dept: LAB | Facility: LAB | Age: 44
End: 2024-03-25
Payer: COMMERCIAL

## 2024-03-25 ENCOUNTER — OFFICE VISIT (OUTPATIENT)
Dept: PRIMARY CARE | Facility: CLINIC | Age: 44
End: 2024-03-25
Payer: COMMERCIAL

## 2024-03-25 VITALS
TEMPERATURE: 97.9 F | DIASTOLIC BLOOD PRESSURE: 80 MMHG | OXYGEN SATURATION: 97 % | BODY MASS INDEX: 32.93 KG/M2 | RESPIRATION RATE: 12 BRPM | WEIGHT: 209.8 LBS | HEIGHT: 67 IN | SYSTOLIC BLOOD PRESSURE: 126 MMHG | HEART RATE: 81 BPM

## 2024-03-25 DIAGNOSIS — Z11.1 SCREENING-PULMONARY TB: ICD-10-CM

## 2024-03-25 DIAGNOSIS — E88.810 METABOLIC SYNDROME: ICD-10-CM

## 2024-03-25 DIAGNOSIS — Z91.018 FOOD ALLERGY: ICD-10-CM

## 2024-03-25 DIAGNOSIS — Z23 FLU VACCINE NEED: ICD-10-CM

## 2024-03-25 DIAGNOSIS — E66.9 CLASS 1 OBESITY WITH SERIOUS COMORBIDITY AND BODY MASS INDEX (BMI) OF 31.0 TO 31.9 IN ADULT, UNSPECIFIED OBESITY TYPE: ICD-10-CM

## 2024-03-25 DIAGNOSIS — E66.9 CLASS 1 OBESITY WITH BODY MASS INDEX (BMI) OF 32.0 TO 32.9 IN ADULT, UNSPECIFIED OBESITY TYPE, UNSPECIFIED WHETHER SERIOUS COMORBIDITY PRESENT: ICD-10-CM

## 2024-03-25 DIAGNOSIS — Z11.3 ROUTINE SCREENING FOR STI (SEXUALLY TRANSMITTED INFECTION): ICD-10-CM

## 2024-03-25 DIAGNOSIS — E66.9 CLASS 1 OBESITY WITH BODY MASS INDEX (BMI) OF 32.0 TO 32.9 IN ADULT, UNSPECIFIED OBESITY TYPE, UNSPECIFIED WHETHER SERIOUS COMORBIDITY PRESENT: Primary | ICD-10-CM

## 2024-03-25 DIAGNOSIS — R63.2 POLYPHAGIA: ICD-10-CM

## 2024-03-25 LAB
25(OH)D3 SERPL-MCNC: 40 NG/ML (ref 30–100)
ALBUMIN SERPL BCP-MCNC: 4.7 G/DL (ref 3.4–5)
ALP SERPL-CCNC: 65 U/L (ref 33–110)
ALT SERPL W P-5'-P-CCNC: 27 U/L (ref 7–45)
ANION GAP SERPL CALC-SCNC: 13 MMOL/L (ref 10–20)
AST SERPL W P-5'-P-CCNC: 32 U/L (ref 9–39)
BILIRUB SERPL-MCNC: 0.9 MG/DL (ref 0–1.2)
BUN SERPL-MCNC: 10 MG/DL (ref 6–23)
CALCIUM SERPL-MCNC: 10.3 MG/DL (ref 8.6–10.6)
CHLORIDE SERPL-SCNC: 99 MMOL/L (ref 98–107)
CO2 SERPL-SCNC: 31 MMOL/L (ref 21–32)
CREAT SERPL-MCNC: 0.74 MG/DL (ref 0.5–1.05)
EGFRCR SERPLBLD CKD-EPI 2021: >90 ML/MIN/1.73M*2
GLUCOSE SERPL-MCNC: 92 MG/DL (ref 74–99)
HIV 1+2 AB+HIV1 P24 AG SERPL QL IA: NONREACTIVE
INSULIN SERPL-ACNC: 13 UIU/ML (ref 3–25)
POTASSIUM SERPL-SCNC: 3.3 MMOL/L (ref 3.5–5.3)
PROT SERPL-MCNC: 8 G/DL (ref 6.4–8.2)
SODIUM SERPL-SCNC: 140 MMOL/L (ref 136–145)
TSH SERPL-ACNC: 1.53 MIU/L (ref 0.44–3.98)

## 2024-03-25 PROCEDURE — 83525 ASSAY OF INSULIN: CPT

## 2024-03-25 PROCEDURE — 3074F SYST BP LT 130 MM HG: CPT | Performed by: STUDENT IN AN ORGANIZED HEALTH CARE EDUCATION/TRAINING PROGRAM

## 2024-03-25 PROCEDURE — 87340 HEPATITIS B SURFACE AG IA: CPT

## 2024-03-25 PROCEDURE — 82306 VITAMIN D 25 HYDROXY: CPT

## 2024-03-25 PROCEDURE — 86803 HEPATITIS C AB TEST: CPT

## 2024-03-25 PROCEDURE — 80053 COMPREHEN METABOLIC PANEL: CPT

## 2024-03-25 PROCEDURE — 90471 IMMUNIZATION ADMIN: CPT | Performed by: STUDENT IN AN ORGANIZED HEALTH CARE EDUCATION/TRAINING PROGRAM

## 2024-03-25 PROCEDURE — 99214 OFFICE O/P EST MOD 30 MIN: CPT | Performed by: STUDENT IN AN ORGANIZED HEALTH CARE EDUCATION/TRAINING PROGRAM

## 2024-03-25 PROCEDURE — 83036 HEMOGLOBIN GLYCOSYLATED A1C: CPT

## 2024-03-25 PROCEDURE — 36415 COLL VENOUS BLD VENIPUNCTURE: CPT

## 2024-03-25 PROCEDURE — 3008F BODY MASS INDEX DOCD: CPT | Performed by: STUDENT IN AN ORGANIZED HEALTH CARE EDUCATION/TRAINING PROGRAM

## 2024-03-25 PROCEDURE — 90686 IIV4 VACC NO PRSV 0.5 ML IM: CPT | Performed by: STUDENT IN AN ORGANIZED HEALTH CARE EDUCATION/TRAINING PROGRAM

## 2024-03-25 PROCEDURE — 86780 TREPONEMA PALLIDUM: CPT

## 2024-03-25 PROCEDURE — 87389 HIV-1 AG W/HIV-1&-2 AB AG IA: CPT

## 2024-03-25 PROCEDURE — 84443 ASSAY THYROID STIM HORMONE: CPT

## 2024-03-25 PROCEDURE — 86481 TB AG RESPONSE T-CELL SUSP: CPT

## 2024-03-25 PROCEDURE — 1036F TOBACCO NON-USER: CPT | Performed by: STUDENT IN AN ORGANIZED HEALTH CARE EDUCATION/TRAINING PROGRAM

## 2024-03-25 PROCEDURE — 3079F DIAST BP 80-89 MM HG: CPT | Performed by: STUDENT IN AN ORGANIZED HEALTH CARE EDUCATION/TRAINING PROGRAM

## 2024-03-25 RX ORDER — EPINEPHRINE 0.3 MG/.3ML
1 INJECTION SUBCUTANEOUS AS NEEDED
Qty: 1 EACH | Refills: 2 | Status: SHIPPED | OUTPATIENT
Start: 2024-03-25

## 2024-03-25 ASSESSMENT — PATIENT HEALTH QUESTIONNAIRE - PHQ9
SUM OF ALL RESPONSES TO PHQ9 QUESTIONS 1 AND 2: 0
2. FEELING DOWN, DEPRESSED OR HOPELESS: NOT AT ALL
1. LITTLE INTEREST OR PLEASURE IN DOING THINGS: NOT AT ALL

## 2024-03-25 NOTE — PROGRESS NOTES
Subjective   Patient ID: Yenni Suero is a pleasant 43 y.o. female who presents for Follow-up (Follow up- weight concerns).  HPI  Patient is here to follow-up on weight loss.  We discussed about lifestyle modification, monitoring to her daily calorie intake.  We also discussed about GLP-1 medications.  Her blood work from her last visit is still pending.  She also would like her vitamin D and thyroid function to be checked.  Needs a flu shot and also needs to be checked for pulmonary TB.      Review of Systems   All other systems reviewed and are negative.      Visit Vitals  /80 (Patient Position: Sitting)   Pulse 81   Temp 36.6 °C (97.9 °F)   Resp 12          Objective   Physical Exam  Constitutional:       General: She is not in acute distress.     Appearance: Normal appearance. She is well-developed and well-groomed.   HENT:      Head: Normocephalic and atraumatic.   Eyes:      General: Lids are normal. No scleral icterus.     Conjunctiva/sclera: Conjunctivae normal.   Pulmonary:      Effort: Pulmonary effort is normal. No accessory muscle usage.   Skin:     General: Skin is warm and dry.   Neurological:      Mental Status: She is alert and oriented to person, place, and time. Mental status is at baseline.   Psychiatric:         Attention and Perception: Attention normal.         Mood and Affect: Mood and affect normal.         Speech: Speech normal.         Behavior: Behavior normal.         Thought Content: Thought content normal.         Cognition and Memory: Cognition and memory normal.         Judgment: Judgment normal.         Assessment/Plan   Problem List Items Addressed This Visit    None  Visit Diagnoses       Class 1 obesity with body mass index (BMI) of 32.0 to 32.9 in adult, unspecified obesity type, unspecified whether serious comorbidity present    -  Primary    Relevant Orders    TSH with reflex to Free T4 if abnormal    Vitamin D 25-Hydroxy,Total (for eval of Vitamin D levels)    Food  allergy        Relevant Medications    EPINEPHrine (Epipen) 0.3 mg/0.3 mL injection syringe    Flu vaccine need        Relevant Orders    Flu vaccine (IIV4) age 6 months and greater, preservative free (Completed)    Screening-pulmonary TB        Relevant Orders    T-Spot TB

## 2024-03-25 NOTE — PATIENT INSTRUCTIONS
Flu shot today.   Continue with current medications.  Blood work before your next visit.  If you receive medical information from [facility], your results will be released into your online chart. This means you may view or see results before someone from our office contact you directly.  Please keep in mind that if blood work or imaging were ordered during your visit, all the nonurgent lab results will be discussed with you at your next office visit.  Please arrive 15 minutes before your appointment.   Follow-up with primary care in 6 months or as needed

## 2024-03-26 LAB
EST. AVERAGE GLUCOSE BLD GHB EST-MCNC: 105 MG/DL
HBA1C MFR BLD: 5.3 %
HBV SURFACE AG SERPL QL IA: NONREACTIVE
HCV AB SER QL: NONREACTIVE
TREPONEMA PALLIDUM IGG+IGM AB [PRESENCE] IN SERUM OR PLASMA BY IMMUNOASSAY: NONREACTIVE

## 2024-03-27 ENCOUNTER — APPOINTMENT (OUTPATIENT)
Dept: ORTHOPEDIC SURGERY | Facility: CLINIC | Age: 44
End: 2024-03-27
Payer: COMMERCIAL

## 2024-03-27 LAB
NIL(NEG) CONTROL SPOT COUNT: NORMAL
PANEL A SPOT COUNT: 0
PANEL B SPOT COUNT: 0
POS CONTROL SPOT COUNT: NORMAL
T-SPOT. TB INTERPRETATION: NEGATIVE

## 2024-04-01 ENCOUNTER — APPOINTMENT (OUTPATIENT)
Dept: PRIMARY CARE | Facility: CLINIC | Age: 44
End: 2024-04-01
Payer: COMMERCIAL

## 2024-04-04 ENCOUNTER — LAB (OUTPATIENT)
Dept: LAB | Facility: LAB | Age: 44
End: 2024-04-04
Payer: COMMERCIAL

## 2024-04-04 ENCOUNTER — OFFICE VISIT (OUTPATIENT)
Dept: PRIMARY CARE | Facility: CLINIC | Age: 44
End: 2024-04-04
Payer: COMMERCIAL

## 2024-04-04 VITALS
RESPIRATION RATE: 12 BRPM | DIASTOLIC BLOOD PRESSURE: 80 MMHG | TEMPERATURE: 97.6 F | HEART RATE: 78 BPM | WEIGHT: 213.5 LBS | SYSTOLIC BLOOD PRESSURE: 118 MMHG | HEIGHT: 67 IN | BODY MASS INDEX: 33.51 KG/M2 | OXYGEN SATURATION: 99 %

## 2024-04-04 DIAGNOSIS — Z00.00 ROUTINE HISTORY AND PHYSICAL EXAMINATION OF ADULT: Primary | ICD-10-CM

## 2024-04-04 DIAGNOSIS — Z01.84 IMMUNITY STATUS TESTING: ICD-10-CM

## 2024-04-04 LAB
HBV SURFACE AB SER-ACNC: 327.3 MIU/ML
MEV IGG SER QL IA: POSITIVE
MUMPS IGG ANTIBODY INDEX: 1.6 IA
MUV IGG SER IA-ACNC: POSITIVE
RUBEOLA IGG ANTIBODY INDEX: 3.9 IA
RUBV IGG SERPL IA-ACNC: 1.8 IA
RUBV IGG SERPL QL IA: POSITIVE

## 2024-04-04 PROCEDURE — 86706 HEP B SURFACE ANTIBODY: CPT

## 2024-04-04 PROCEDURE — 3079F DIAST BP 80-89 MM HG: CPT | Performed by: STUDENT IN AN ORGANIZED HEALTH CARE EDUCATION/TRAINING PROGRAM

## 2024-04-04 PROCEDURE — 36415 COLL VENOUS BLD VENIPUNCTURE: CPT

## 2024-04-04 PROCEDURE — 3074F SYST BP LT 130 MM HG: CPT | Performed by: STUDENT IN AN ORGANIZED HEALTH CARE EDUCATION/TRAINING PROGRAM

## 2024-04-04 PROCEDURE — 3008F BODY MASS INDEX DOCD: CPT | Performed by: STUDENT IN AN ORGANIZED HEALTH CARE EDUCATION/TRAINING PROGRAM

## 2024-04-04 PROCEDURE — 86735 MUMPS ANTIBODY: CPT

## 2024-04-04 PROCEDURE — 86765 RUBEOLA ANTIBODY: CPT

## 2024-04-04 PROCEDURE — 86317 IMMUNOASSAY INFECTIOUS AGENT: CPT

## 2024-04-04 PROCEDURE — 99396 PREV VISIT EST AGE 40-64: CPT | Performed by: STUDENT IN AN ORGANIZED HEALTH CARE EDUCATION/TRAINING PROGRAM

## 2024-04-04 PROCEDURE — 1036F TOBACCO NON-USER: CPT | Performed by: STUDENT IN AN ORGANIZED HEALTH CARE EDUCATION/TRAINING PROGRAM

## 2024-04-04 ASSESSMENT — PATIENT HEALTH QUESTIONNAIRE - PHQ9
1. LITTLE INTEREST OR PLEASURE IN DOING THINGS: NOT AT ALL
2. FEELING DOWN, DEPRESSED OR HOPELESS: NOT AT ALL
SUM OF ALL RESPONSES TO PHQ9 QUESTIONS 1 AND 2: 0

## 2024-04-04 NOTE — PROGRESS NOTES
Subjective   Patient ID: Yenni Suero is a pleasant 43 y.o. female who presents for Annual Exam (Physical- paperwork).  HPI  Health Maintenance:  -   Colonoscopy: ordered   -   Mammogram: Sept or Aug 2023  -   PAP: NA   -   Bone density DEXA: NA     Immunizations:  - COVID vaccination status: x2 dose  - Influenza: UTD   - Shingles:at 50   - TDAP: 2018  - Pneumo Vaccine: at 65    She is up-to-date with annual eye exam  Adheres to a healthy well-balanced diet  Physically active and exercises regularly      Review of Systems   All other systems reviewed and are negative.      Visit Vitals  /80 (Patient Position: Sitting)   Pulse 78   Temp 36.4 °C (97.6 °F)   Resp 12          Objective   Physical Exam  Constitutional:       General: She is not in acute distress.     Appearance: Normal appearance.   HENT:      Head: Normocephalic and atraumatic.   Eyes:      General: No scleral icterus.     Conjunctiva/sclera: Conjunctivae normal.   Cardiovascular:      Rate and Rhythm: Normal rate and regular rhythm.      Heart sounds: Normal heart sounds.   Pulmonary:      Effort: Pulmonary effort is normal.      Breath sounds: Normal breath sounds. No wheezing.   Abdominal:      General: Bowel sounds are normal. There is no distension.      Palpations: Abdomen is soft.      Tenderness: There is no abdominal tenderness.   Musculoskeletal:      Cervical back: Neck supple.      Right lower leg: No edema.      Left lower leg: No edema.   Lymphadenopathy:      Cervical: No cervical adenopathy.   Skin:     General: Skin is warm and dry.   Neurological:      General: No focal deficit present.      Mental Status: She is alert and oriented to person, place, and time.   Psychiatric:         Mood and Affect: Mood normal.         Behavior: Behavior normal.         Assessment/Plan   Problem List Items Addressed This Visit    None  Visit Diagnoses       Routine history and physical examination of adult    -  Primary    Immunity status  testing        Relevant Orders    Hepatitis B Surface Antibody    Mumps Antibody, IgG    Rubella Antibody, IgG    Rubeola Antibody, IgG

## 2024-04-04 NOTE — PATIENT INSTRUCTIONS
Continue with current medications.  Blood work ordered   If you receive medical information from [facility], your results will be released into your online chart. This means you may view or see results before someone from our office contact you directly.  Please keep in mind that if blood work or imaging were ordered during your visit, all the nonurgent lab results will be discussed with you at your next office visit.  Please arrive 15 minutes before your appointment.   Follow-up with primary care in 12 months for physical or as needed

## 2024-05-27 ENCOUNTER — APPOINTMENT (OUTPATIENT)
Dept: PRIMARY CARE | Facility: CLINIC | Age: 44
End: 2024-05-27
Payer: COMMERCIAL

## 2024-05-28 ENCOUNTER — TELEPHONE (OUTPATIENT)
Dept: OBSTETRICS AND GYNECOLOGY | Facility: CLINIC | Age: 44
End: 2024-05-28
Payer: COMMERCIAL

## 2024-05-28 NOTE — TELEPHONE ENCOUNTER
RN called pt and she went and got an OTC yeast medication to treat for presumed yeast. Pt reports her symptoms are getting better and advised she may make an appt as needed for unresolved vag itching, discharge concerns,

## 2024-05-30 ENCOUNTER — APPOINTMENT (OUTPATIENT)
Dept: PRIMARY CARE | Facility: CLINIC | Age: 44
End: 2024-05-30
Payer: COMMERCIAL

## 2024-09-23 ENCOUNTER — APPOINTMENT (OUTPATIENT)
Dept: PRIMARY CARE | Facility: CLINIC | Age: 44
End: 2024-09-23
Payer: COMMERCIAL

## 2024-09-25 ENCOUNTER — APPOINTMENT (OUTPATIENT)
Dept: OBSTETRICS AND GYNECOLOGY | Facility: CLINIC | Age: 44
End: 2024-09-25
Payer: COMMERCIAL

## 2024-10-07 ENCOUNTER — HOSPITAL ENCOUNTER (OUTPATIENT)
Dept: RADIOLOGY | Facility: HOSPITAL | Age: 44
Discharge: HOME | End: 2024-10-07
Payer: COMMERCIAL

## 2024-10-07 ENCOUNTER — OFFICE VISIT (OUTPATIENT)
Dept: OBSTETRICS AND GYNECOLOGY | Facility: CLINIC | Age: 44
End: 2024-10-07
Payer: COMMERCIAL

## 2024-10-07 ENCOUNTER — LAB (OUTPATIENT)
Dept: LAB | Facility: LAB | Age: 44
End: 2024-10-07
Payer: COMMERCIAL

## 2024-10-07 ENCOUNTER — HOSPITAL ENCOUNTER (OUTPATIENT)
Facility: HOSPITAL | Age: 44
Setting detail: OBSERVATION
Discharge: HOME | End: 2024-10-08
Attending: EMERGENCY MEDICINE | Admitting: INTERNAL MEDICINE
Payer: COMMERCIAL

## 2024-10-07 VITALS
SYSTOLIC BLOOD PRESSURE: 156 MMHG | HEIGHT: 67 IN | WEIGHT: 202 LBS | BODY MASS INDEX: 31.71 KG/M2 | DIASTOLIC BLOOD PRESSURE: 100 MMHG

## 2024-10-07 DIAGNOSIS — R10.30 LOWER ABDOMINAL PAIN: Primary | ICD-10-CM

## 2024-10-07 DIAGNOSIS — R31.9 HEMATURIA, UNSPECIFIED TYPE: ICD-10-CM

## 2024-10-07 DIAGNOSIS — R10.2 PELVIC PAIN IN FEMALE: Primary | ICD-10-CM

## 2024-10-07 DIAGNOSIS — R10.2 PELVIC PAIN IN FEMALE: ICD-10-CM

## 2024-10-07 DIAGNOSIS — Z87.42 HISTORY OF ENDOMETRIOSIS: ICD-10-CM

## 2024-10-07 DIAGNOSIS — K46.9 HERNIA: ICD-10-CM

## 2024-10-07 LAB
ALBUMIN SERPL BCP-MCNC: 4.4 G/DL (ref 3.4–5)
ALP SERPL-CCNC: 82 U/L (ref 33–110)
ALT SERPL W P-5'-P-CCNC: 71 U/L (ref 7–45)
ANION GAP SERPL CALC-SCNC: 8 MMOL/L (ref 10–20)
AST SERPL W P-5'-P-CCNC: 104 U/L (ref 9–39)
B-HCG SERPL-ACNC: <2 MIU/ML
BASOPHILS # BLD AUTO: 0.04 X10*3/UL (ref 0–0.1)
BASOPHILS NFR BLD AUTO: 0.6 %
BILIRUB SERPL-MCNC: 0.5 MG/DL (ref 0–1.2)
BUN SERPL-MCNC: 9 MG/DL (ref 6–23)
CALCIUM SERPL-MCNC: 8.9 MG/DL (ref 8.6–10.3)
CHLORIDE SERPL-SCNC: 100 MMOL/L (ref 98–107)
CO2 SERPL-SCNC: 33 MMOL/L (ref 21–32)
CREAT SERPL-MCNC: 0.74 MG/DL (ref 0.5–1.05)
EGFRCR SERPLBLD CKD-EPI 2021: >90 ML/MIN/1.73M*2
EOSINOPHIL # BLD AUTO: 0.1 X10*3/UL (ref 0–0.7)
EOSINOPHIL NFR BLD AUTO: 1.6 %
ERYTHROCYTE [DISTWIDTH] IN BLOOD BY AUTOMATED COUNT: 11.7 % (ref 11.5–14.5)
GLUCOSE SERPL-MCNC: 100 MG/DL (ref 74–99)
HCT VFR BLD AUTO: 42.3 % (ref 36–46)
HGB BLD-MCNC: 14 G/DL (ref 12–16)
IMM GRANULOCYTES # BLD AUTO: 0.01 X10*3/UL (ref 0–0.7)
IMM GRANULOCYTES NFR BLD AUTO: 0.2 % (ref 0–0.9)
LACTATE SERPL-SCNC: 1.6 MMOL/L (ref 0.4–2)
LIPASE SERPL-CCNC: 24 U/L (ref 9–82)
LYMPHOCYTES # BLD AUTO: 3.04 X10*3/UL (ref 1.2–4.8)
LYMPHOCYTES NFR BLD AUTO: 48.3 %
MCH RBC QN AUTO: 29.8 PG (ref 26–34)
MCHC RBC AUTO-ENTMCNC: 33.1 G/DL (ref 32–36)
MCV RBC AUTO: 90 FL (ref 80–100)
MONOCYTES # BLD AUTO: 0.41 X10*3/UL (ref 0.1–1)
MONOCYTES NFR BLD AUTO: 6.5 %
NEUTROPHILS # BLD AUTO: 2.7 X10*3/UL (ref 1.2–7.7)
NEUTROPHILS NFR BLD AUTO: 42.8 %
NRBC BLD-RTO: 0 /100 WBCS (ref 0–0)
PLATELET # BLD AUTO: 370 X10*3/UL (ref 150–450)
POTASSIUM SERPL-SCNC: 2.9 MMOL/L (ref 3.5–5.3)
PROT SERPL-MCNC: 7.2 G/DL (ref 6.4–8.2)
RBC # BLD AUTO: 4.7 X10*6/UL (ref 4–5.2)
SODIUM SERPL-SCNC: 138 MMOL/L (ref 136–145)
WBC # BLD AUTO: 6.3 X10*3/UL (ref 4.4–11.3)

## 2024-10-07 PROCEDURE — 84702 CHORIONIC GONADOTROPIN TEST: CPT | Performed by: NURSE PRACTITIONER

## 2024-10-07 PROCEDURE — 96376 TX/PRO/DX INJ SAME DRUG ADON: CPT

## 2024-10-07 PROCEDURE — 85025 COMPLETE CBC W/AUTO DIFF WBC: CPT | Performed by: NURSE PRACTITIONER

## 2024-10-07 PROCEDURE — 80053 COMPREHEN METABOLIC PANEL: CPT | Performed by: NURSE PRACTITIONER

## 2024-10-07 PROCEDURE — 99285 EMERGENCY DEPT VISIT HI MDM: CPT

## 2024-10-07 PROCEDURE — 83605 ASSAY OF LACTIC ACID: CPT | Performed by: NURSE PRACTITIONER

## 2024-10-07 PROCEDURE — 83690 ASSAY OF LIPASE: CPT | Performed by: NURSE PRACTITIONER

## 2024-10-07 PROCEDURE — 99214 OFFICE O/P EST MOD 30 MIN: CPT | Performed by: OBSTETRICS & GYNECOLOGY

## 2024-10-07 PROCEDURE — 96375 TX/PRO/DX INJ NEW DRUG ADDON: CPT

## 2024-10-07 PROCEDURE — 3080F DIAST BP >= 90 MM HG: CPT | Performed by: OBSTETRICS & GYNECOLOGY

## 2024-10-07 PROCEDURE — 80053 COMPREHEN METABOLIC PANEL: CPT

## 2024-10-07 PROCEDURE — 1036F TOBACCO NON-USER: CPT | Performed by: OBSTETRICS & GYNECOLOGY

## 2024-10-07 PROCEDURE — 3008F BODY MASS INDEX DOCD: CPT | Performed by: OBSTETRICS & GYNECOLOGY

## 2024-10-07 PROCEDURE — 96374 THER/PROPH/DIAG INJ IV PUSH: CPT

## 2024-10-07 PROCEDURE — 2500000001 HC RX 250 WO HCPCS SELF ADMINISTERED DRUGS (ALT 637 FOR MEDICARE OP): Performed by: EMERGENCY MEDICINE

## 2024-10-07 PROCEDURE — 2550000001 HC RX 255 CONTRASTS: Performed by: OBSTETRICS & GYNECOLOGY

## 2024-10-07 PROCEDURE — 3077F SYST BP >= 140 MM HG: CPT | Performed by: OBSTETRICS & GYNECOLOGY

## 2024-10-07 PROCEDURE — 36415 COLL VENOUS BLD VENIPUNCTURE: CPT | Performed by: NURSE PRACTITIONER

## 2024-10-07 PROCEDURE — 74177 CT ABD & PELVIS W/CONTRAST: CPT

## 2024-10-07 PROCEDURE — 74177 CT ABD & PELVIS W/CONTRAST: CPT | Performed by: STUDENT IN AN ORGANIZED HEALTH CARE EDUCATION/TRAINING PROGRAM

## 2024-10-07 PROCEDURE — 2500000004 HC RX 250 GENERAL PHARMACY W/ HCPCS (ALT 636 FOR OP/ED): Performed by: EMERGENCY MEDICINE

## 2024-10-07 RX ORDER — POTASSIUM CHLORIDE 1.5 G/1.58G
40 POWDER, FOR SOLUTION ORAL 2 TIMES DAILY
Status: DISCONTINUED | OUTPATIENT
Start: 2024-10-07 | End: 2024-10-08

## 2024-10-07 RX ORDER — MAGNESIUM HYDROXIDE 2400 MG/10ML
30 SUSPENSION ORAL ONCE
Status: COMPLETED | OUTPATIENT
Start: 2024-10-07 | End: 2024-10-07

## 2024-10-07 RX ORDER — MORPHINE SULFATE 4 MG/ML
4 INJECTION, SOLUTION INTRAMUSCULAR; INTRAVENOUS ONCE
Status: COMPLETED | OUTPATIENT
Start: 2024-10-07 | End: 2024-10-07

## 2024-10-07 RX ORDER — ONDANSETRON HYDROCHLORIDE 2 MG/ML
4 INJECTION, SOLUTION INTRAVENOUS ONCE
Status: COMPLETED | OUTPATIENT
Start: 2024-10-07 | End: 2024-10-07

## 2024-10-07 ASSESSMENT — PAIN DESCRIPTION - ORIENTATION: ORIENTATION: LOWER

## 2024-10-07 ASSESSMENT — PAIN SCALES - GENERAL
PAINLEVEL_OUTOF10: 9
PAINLEVEL_OUTOF10: 8

## 2024-10-07 ASSESSMENT — PAIN DESCRIPTION - ONSET: ONSET: GRADUAL

## 2024-10-07 ASSESSMENT — PAIN DESCRIPTION - LOCATION: LOCATION: ABDOMEN

## 2024-10-07 ASSESSMENT — COLUMBIA-SUICIDE SEVERITY RATING SCALE - C-SSRS
2. HAVE YOU ACTUALLY HAD ANY THOUGHTS OF KILLING YOURSELF?: NO
6. HAVE YOU EVER DONE ANYTHING, STARTED TO DO ANYTHING, OR PREPARED TO DO ANYTHING TO END YOUR LIFE?: NO
1. IN THE PAST MONTH, HAVE YOU WISHED YOU WERE DEAD OR WISHED YOU COULD GO TO SLEEP AND NOT WAKE UP?: NO

## 2024-10-07 ASSESSMENT — PAIN DESCRIPTION - PROGRESSION: CLINICAL_PROGRESSION: NOT CHANGED

## 2024-10-07 ASSESSMENT — PAIN DESCRIPTION - DESCRIPTORS: DESCRIPTORS: CRAMPING

## 2024-10-07 ASSESSMENT — PAIN - FUNCTIONAL ASSESSMENT: PAIN_FUNCTIONAL_ASSESSMENT: 0-10

## 2024-10-07 ASSESSMENT — PAIN DESCRIPTION - FREQUENCY: FREQUENCY: CONSTANT/CONTINUOUS

## 2024-10-07 ASSESSMENT — PAIN DESCRIPTION - PAIN TYPE: TYPE: ACUTE PAIN

## 2024-10-07 NOTE — ED TRIAGE NOTES
Secondary to patient volumes and overcrowding, I performed a brief medical screening exam of the patient in triage, as the patient awaits space in the main ED.    History of Present Illness:  Yenni Suero presents with   Chief Complaint   Patient presents with    Abdominal Pain       Physical Exam:  General - In no acute distress  Respiratory - Breathing comfortably  Cardiac - Normal S1, S2, no m/g/r  Neurologic - Moving all extremities  Abdomen -bowel sounds present, no CVAT, moderate suprapubic tenderness.    Medical Decision Making:  Patient will require further evaluation in the main ED.    Initial diagnostic tests were ordered from triage.      The patient demonstrates understanding that this initial evaluation is a brief medical screening exam and the expectation is that they await for space in the main ED to be further evaluated.  The patient understands that, if they leave prior to further evaluation in the main ED after this initial evaluation in triage, they are doing so under their own accord knowing that their evaluation/work-up is not yet complete. The patient also understands that any preliminary diagnostic results, including abnormalities, may not be shared with them, if they choose to leave prior to further evaluation in the main ED.

## 2024-10-07 NOTE — PROGRESS NOTES
"SUBJECTIVE    44 y.o.  Hysterectomy female presents for   Chief Complaint   Patient presents with    Pelvic Pain     Patient complains she is having severe left pelvic pain, lower back pain.     Pt presents for evaluation of pelvic pain.  She has a h/o endometriosis and \"chocolate cysts\" and had a prior hysterectomy in 2018 (ovaries were left behind).  She was initially pain free for 6-7 months and then she began having cyclic cramping (although less severe). Around  her pain became more severe and subsequently she tried Norethindrone (provided temporary relief) followed by Orilissa in 2023.  This helped somewhat and mariae lena continues this once daily. Pelvic floor PT was recommended but she has not followed up.  She reports she did see Dr. Villagran although there are no notes in the system.  Pt was left on Orilissa but was told surgery might be indicated (although there is no note in the system).    Today she reports her pain is severe enough that she cannot get comfortable.  She has had no relief with Ibuprofen and Tramadol. Pain is central in her lower pelvis, radiating to her left flank.  Her pain has been chronic but getting gradually worse, although it has more acutely worsened over the last 48 hours.      Of note, pt did have an MRI in  that showed Multilevel mild-to-moderate lumbar spondylosis, most prominent at   L3-L4 through L5-S1 with moderate neural foraminal stenosis.  She does have chronic pack pain.    She denies fevers/chills or hematuria.    OB/GYN History  No LMP recorded. Patient has had a hysterectomy.    Social History     Substance and Sexual Activity   Sexual Activity Yes       OB History    Para Term  AB Living   4 2 2 0 2 2   SAB IAB Ectopic Multiple Live Births   0 0 0 0 2      # Outcome Date GA Lbr Yamil/2nd Weight Sex Type Anes PTL Lv   4 AB            3 AB            2 Term            1 Term                Past Medical History  She has a past medical history of " "Dorsalgia, unspecified (10/12/2018), Encounter for general adult medical examination without abnormal findings (09/05/2019), Endometriosis, and Personal history of other diseases of the female genital tract (03/24/2014).    Surgical History  She has a past surgical history that includes Other surgical history (12/03/2018).     Social History  She reports that she has never smoked. She has never used smokeless tobacco. She reports current alcohol use. She reports that she does not use drugs.    Medications:  Scheduled medications    Continuous medications    PRN medications      Screenings  Social Determinants of Health     Tobacco Use: Low Risk  (10/7/2024)    Patient History     Smoking Tobacco Use: Never     Smokeless Tobacco Use: Never     Passive Exposure: Not on file   Alcohol Use: Not on file   Financial Resource Strain: Not on file   Food Insecurity: Not on file   Transportation Needs: Not on file   Physical Activity: Not on file   Stress: Not on file   Social Connections: Not on file   Intimate Partner Violence: Not on file   Depression: Not at risk (4/4/2024)    PHQ-2     PHQ-2 Score: 0   Housing Stability: Not on file   Utilities: Not on file   Digital Equity: Not on file   Health Literacy: Not on file         OBJECTIVE  Vitals:    10/07/24 1142   BP: (!) 156/100   Weight: 91.6 kg (202 lb)   Height: 1.702 m (5' 7\")     Body mass index is 31.64 kg/m².     Chaperone: Present: no  OBGyn Exam   On exam, pt lying rigid in bed.  She has a hard time standing/walking.  Pelvic deferred.    UA notable for large blood.      ASSESSMENT & PLAN  Problem List Items Addressed This Visit    None  Visit Diagnoses       Pelvic pain in female    -  Primary    Relevant Orders    Comprehensive Metabolic Panel    CT abdomen pelvis w IV contrast    History of endometriosis        Relevant Orders    Comprehensive Metabolic Panel    CT abdomen pelvis w IV contrast    Hematuria, unspecified type        Relevant Orders    " Comprehensive Metabolic Panel            Follow up: Discussed options foe acute evaluation given the level of discomfort the pt is having. Offered ED visit.  Suspicious pain could be from either endometriosis or possibly renal calculi given acute exacerbation and blood in urine.  Able to get pt CT andersen today at 445 PM; orders and labs placed.  Will follow up results after scan obtained.  Will also reach out to Dr. Villagran given her prior contact with the pt.    ADDENDUM: Old notes form Dr. Villagran seen in [facility]. Pathology report scanned into system (5/13/2019) showing only fibroids and no endometriosis. No peritoneum sampling obtained.    Bruce Lynn MD  Obstetrics & Gynecology  10/07/24

## 2024-10-07 NOTE — ED TRIAGE NOTES
Patient presents to ED for lower abdominal pain that radiates to her back. History of endometriosis. Patient saw her provider today and was told if the pain persists then she needed to come to ED. Denies N/V/D.

## 2024-10-08 ENCOUNTER — TELEPHONE (OUTPATIENT)
Dept: OBSTETRICS AND GYNECOLOGY | Facility: CLINIC | Age: 44
End: 2024-10-08
Payer: COMMERCIAL

## 2024-10-08 VITALS
OXYGEN SATURATION: 97 % | TEMPERATURE: 98 F | HEART RATE: 76 BPM | SYSTOLIC BLOOD PRESSURE: 124 MMHG | HEIGHT: 67 IN | WEIGHT: 202 LBS | DIASTOLIC BLOOD PRESSURE: 77 MMHG | RESPIRATION RATE: 16 BRPM | BODY MASS INDEX: 31.71 KG/M2

## 2024-10-08 PROBLEM — R10.9 ABDOMINAL PAIN: Status: ACTIVE | Noted: 2024-10-08

## 2024-10-08 PROBLEM — K46.9 HERNIA: Status: ACTIVE | Noted: 2024-10-08

## 2024-10-08 LAB
ALBUMIN SERPL BCP-MCNC: 4.5 G/DL (ref 3.4–5)
ALP SERPL-CCNC: 80 U/L (ref 33–110)
ALT SERPL W P-5'-P-CCNC: 80 U/L (ref 7–45)
ANION GAP SERPL CALC-SCNC: 12 MMOL/L (ref 10–20)
ANION GAP SERPL CALC-SCNC: 9 MMOL/L (ref 10–20)
AST SERPL W P-5'-P-CCNC: 118 U/L (ref 9–39)
BASOPHILS # BLD AUTO: 0.05 X10*3/UL (ref 0–0.1)
BASOPHILS NFR BLD AUTO: 0.8 %
BILIRUB SERPL-MCNC: 0.8 MG/DL (ref 0–1.2)
BUN SERPL-MCNC: 11 MG/DL (ref 6–23)
BUN SERPL-MCNC: 8 MG/DL (ref 6–23)
CALCIUM SERPL-MCNC: 10 MG/DL (ref 8.6–10.6)
CALCIUM SERPL-MCNC: 8.4 MG/DL (ref 8.6–10.3)
CHLORIDE SERPL-SCNC: 101 MMOL/L (ref 98–107)
CHLORIDE SERPL-SCNC: 102 MMOL/L (ref 98–107)
CO2 SERPL-SCNC: 31 MMOL/L (ref 21–32)
CO2 SERPL-SCNC: 33 MMOL/L (ref 21–32)
CREAT SERPL-MCNC: 0.7 MG/DL (ref 0.5–1.05)
CREAT SERPL-MCNC: 0.71 MG/DL (ref 0.5–1.05)
EGFRCR SERPLBLD CKD-EPI 2021: >90 ML/MIN/1.73M*2
EGFRCR SERPLBLD CKD-EPI 2021: >90 ML/MIN/1.73M*2
EOSINOPHIL # BLD AUTO: 0.1 X10*3/UL (ref 0–0.7)
EOSINOPHIL NFR BLD AUTO: 1.6 %
ERYTHROCYTE [DISTWIDTH] IN BLOOD BY AUTOMATED COUNT: 11.8 % (ref 11.5–14.5)
GLUCOSE SERPL-MCNC: 116 MG/DL (ref 74–99)
GLUCOSE SERPL-MCNC: 99 MG/DL (ref 74–99)
HCT VFR BLD AUTO: 40.4 % (ref 36–46)
HGB BLD-MCNC: 13.5 G/DL (ref 12–16)
IMM GRANULOCYTES # BLD AUTO: 0.02 X10*3/UL (ref 0–0.7)
IMM GRANULOCYTES NFR BLD AUTO: 0.3 % (ref 0–0.9)
LYMPHOCYTES # BLD AUTO: 2.38 X10*3/UL (ref 1.2–4.8)
LYMPHOCYTES NFR BLD AUTO: 37.8 %
MAGNESIUM SERPL-MCNC: 2.1 MG/DL (ref 1.6–2.4)
MCH RBC QN AUTO: 29.9 PG (ref 26–34)
MCHC RBC AUTO-ENTMCNC: 33.4 G/DL (ref 32–36)
MCV RBC AUTO: 90 FL (ref 80–100)
MONOCYTES # BLD AUTO: 0.35 X10*3/UL (ref 0.1–1)
MONOCYTES NFR BLD AUTO: 5.6 %
NEUTROPHILS # BLD AUTO: 3.4 X10*3/UL (ref 1.2–7.7)
NEUTROPHILS NFR BLD AUTO: 53.9 %
NRBC BLD-RTO: 0 /100 WBCS (ref 0–0)
PLATELET # BLD AUTO: 363 X10*3/UL (ref 150–450)
POTASSIUM SERPL-SCNC: 3 MMOL/L (ref 3.5–5.3)
POTASSIUM SERPL-SCNC: 3.5 MMOL/L (ref 3.5–5.3)
PROT SERPL-MCNC: 7.7 G/DL (ref 6.4–8.2)
RBC # BLD AUTO: 4.51 X10*6/UL (ref 4–5.2)
SODIUM SERPL-SCNC: 139 MMOL/L (ref 136–145)
SODIUM SERPL-SCNC: 142 MMOL/L (ref 136–145)
WBC # BLD AUTO: 6.3 X10*3/UL (ref 4.4–11.3)

## 2024-10-08 PROCEDURE — 2500000001 HC RX 250 WO HCPCS SELF ADMINISTERED DRUGS (ALT 637 FOR MEDICARE OP): Performed by: EMERGENCY MEDICINE

## 2024-10-08 PROCEDURE — 36415 COLL VENOUS BLD VENIPUNCTURE: CPT | Performed by: HOSPITALIST

## 2024-10-08 PROCEDURE — 96372 THER/PROPH/DIAG INJ SC/IM: CPT | Performed by: HOSPITALIST

## 2024-10-08 PROCEDURE — 80048 BASIC METABOLIC PNL TOTAL CA: CPT | Performed by: HOSPITALIST

## 2024-10-08 PROCEDURE — 2500000004 HC RX 250 GENERAL PHARMACY W/ HCPCS (ALT 636 FOR OP/ED): Performed by: HOSPITALIST

## 2024-10-08 PROCEDURE — 96376 TX/PRO/DX INJ SAME DRUG ADON: CPT

## 2024-10-08 PROCEDURE — 2500000001 HC RX 250 WO HCPCS SELF ADMINISTERED DRUGS (ALT 637 FOR MEDICARE OP): Performed by: INTERNAL MEDICINE

## 2024-10-08 PROCEDURE — 2500000001 HC RX 250 WO HCPCS SELF ADMINISTERED DRUGS (ALT 637 FOR MEDICARE OP): Performed by: HOSPITALIST

## 2024-10-08 PROCEDURE — 99234 HOSP IP/OBS SM DT SF/LOW 45: CPT | Performed by: INTERNAL MEDICINE

## 2024-10-08 PROCEDURE — G0378 HOSPITAL OBSERVATION PER HR: HCPCS

## 2024-10-08 PROCEDURE — 96375 TX/PRO/DX INJ NEW DRUG ADDON: CPT

## 2024-10-08 PROCEDURE — 83735 ASSAY OF MAGNESIUM: CPT | Performed by: HOSPITALIST

## 2024-10-08 PROCEDURE — 2500000004 HC RX 250 GENERAL PHARMACY W/ HCPCS (ALT 636 FOR OP/ED): Performed by: INTERNAL MEDICINE

## 2024-10-08 PROCEDURE — 85025 COMPLETE CBC W/AUTO DIFF WBC: CPT | Performed by: HOSPITALIST

## 2024-10-08 RX ORDER — KETOROLAC TROMETHAMINE 10 MG/1
10 TABLET, FILM COATED ORAL EVERY 8 HOURS PRN
Qty: 12 TABLET | Refills: 0 | Status: SHIPPED | OUTPATIENT
Start: 2024-10-08 | End: 2024-10-08

## 2024-10-08 RX ORDER — SODIUM CHLORIDE 9 MG/ML
100 INJECTION, SOLUTION INTRAVENOUS CONTINUOUS
Status: DISCONTINUED | OUTPATIENT
Start: 2024-10-08 | End: 2024-10-08

## 2024-10-08 RX ORDER — AMLODIPINE BESYLATE 10 MG/1
10 TABLET ORAL DAILY
Status: DISCONTINUED | OUTPATIENT
Start: 2024-10-08 | End: 2024-10-08 | Stop reason: HOSPADM

## 2024-10-08 RX ORDER — POLYETHYLENE GLYCOL 3350 17 G/17G
17 POWDER, FOR SOLUTION ORAL DAILY
Status: DISCONTINUED | OUTPATIENT
Start: 2024-10-08 | End: 2024-10-08 | Stop reason: HOSPADM

## 2024-10-08 RX ORDER — ONDANSETRON 4 MG/1
4 TABLET, ORALLY DISINTEGRATING ORAL EVERY 8 HOURS PRN
Status: DISCONTINUED | OUTPATIENT
Start: 2024-10-08 | End: 2024-10-08 | Stop reason: HOSPADM

## 2024-10-08 RX ORDER — DEXTROAMPHETAMINE SACCHARATE, AMPHETAMINE ASPARTATE, DEXTROAMPHETAMINE SULFATE AND AMPHETAMINE SULFATE 2.5; 2.5; 2.5; 2.5 MG/1; MG/1; MG/1; MG/1
1 TABLET ORAL
COMMUNITY
Start: 2024-08-03

## 2024-10-08 RX ORDER — BISACODYL 10 MG/1
10 SUPPOSITORY RECTAL DAILY
Status: DISCONTINUED | OUTPATIENT
Start: 2024-10-08 | End: 2024-10-08

## 2024-10-08 RX ORDER — BISACODYL 5 MG
10 TABLET, DELAYED RELEASE (ENTERIC COATED) ORAL ONCE
Status: COMPLETED | OUTPATIENT
Start: 2024-10-08 | End: 2024-10-08

## 2024-10-08 RX ORDER — KETOROLAC TROMETHAMINE 10 MG/1
10 TABLET, FILM COATED ORAL EVERY 6 HOURS PRN
Qty: 20 TABLET | Refills: 0 | Status: SHIPPED | OUTPATIENT
Start: 2024-10-08

## 2024-10-08 RX ORDER — ONDANSETRON HYDROCHLORIDE 2 MG/ML
4 INJECTION, SOLUTION INTRAVENOUS EVERY 8 HOURS PRN
Status: DISCONTINUED | OUTPATIENT
Start: 2024-10-08 | End: 2024-10-08 | Stop reason: HOSPADM

## 2024-10-08 RX ORDER — POLYETHYLENE GLYCOL 3350 17 G/17G
17 POWDER, FOR SOLUTION ORAL DAILY PRN
Qty: 30 PACKET | Refills: 0 | Status: SHIPPED | OUTPATIENT
Start: 2024-10-08 | End: 2024-11-07

## 2024-10-08 RX ORDER — SENNOSIDES 8.6 MG/1
1 TABLET ORAL 2 TIMES DAILY
Status: DISCONTINUED | OUTPATIENT
Start: 2024-10-08 | End: 2024-10-08

## 2024-10-08 RX ORDER — ACETAMINOPHEN 325 MG/1
650 TABLET ORAL EVERY 4 HOURS PRN
Status: DISCONTINUED | OUTPATIENT
Start: 2024-10-08 | End: 2024-10-08 | Stop reason: HOSPADM

## 2024-10-08 RX ORDER — BUSPIRONE HYDROCHLORIDE 7.5 MG/1
7.5 TABLET ORAL 2 TIMES DAILY
COMMUNITY
Start: 2024-09-25

## 2024-10-08 RX ORDER — ENOXAPARIN SODIUM 100 MG/ML
40 INJECTION SUBCUTANEOUS EVERY 24 HOURS
Status: DISCONTINUED | OUTPATIENT
Start: 2024-10-08 | End: 2024-10-08 | Stop reason: HOSPADM

## 2024-10-08 RX ORDER — BUPROPION HYDROCHLORIDE 150 MG/1
300 TABLET ORAL DAILY
Status: DISCONTINUED | OUTPATIENT
Start: 2024-10-08 | End: 2024-10-08 | Stop reason: HOSPADM

## 2024-10-08 RX ORDER — KETOROLAC TROMETHAMINE 30 MG/ML
15 INJECTION, SOLUTION INTRAMUSCULAR; INTRAVENOUS EVERY 8 HOURS PRN
Status: DISCONTINUED | OUTPATIENT
Start: 2024-10-08 | End: 2024-10-08 | Stop reason: HOSPADM

## 2024-10-08 RX ORDER — POTASSIUM CHLORIDE 1.5 G/1.58G
20 POWDER, FOR SOLUTION ORAL ONCE
Status: COMPLETED | OUTPATIENT
Start: 2024-10-08 | End: 2024-10-08

## 2024-10-08 RX ORDER — MORPHINE SULFATE 2 MG/ML
1 INJECTION, SOLUTION INTRAMUSCULAR; INTRAVENOUS ONCE
Status: COMPLETED | OUTPATIENT
Start: 2024-10-08 | End: 2024-10-08

## 2024-10-08 ASSESSMENT — ACTIVITIES OF DAILY LIVING (ADL): LACK_OF_TRANSPORTATION: NO

## 2024-10-08 ASSESSMENT — PAIN DESCRIPTION - LOCATION
LOCATION: ABDOMEN
LOCATION: ABDOMEN

## 2024-10-08 ASSESSMENT — ENCOUNTER SYMPTOMS
NEUROLOGICAL NEGATIVE: 1
RESPIRATORY NEGATIVE: 1
CONSTITUTIONAL NEGATIVE: 1
PSYCHIATRIC NEGATIVE: 1
CARDIOVASCULAR NEGATIVE: 1
CONSTIPATION: 1
ABDOMINAL PAIN: 1
MUSCULOSKELETAL NEGATIVE: 1

## 2024-10-08 ASSESSMENT — PAIN DESCRIPTION - ORIENTATION: ORIENTATION: LEFT

## 2024-10-08 ASSESSMENT — PAIN SCALES - GENERAL
PAINLEVEL_OUTOF10: 0 - NO PAIN
PAINLEVEL_OUTOF10: 2
PAINLEVEL_OUTOF10: 7
PAINLEVEL_OUTOF10: 2
PAINLEVEL_OUTOF10: 6

## 2024-10-08 ASSESSMENT — PAIN - FUNCTIONAL ASSESSMENT
PAIN_FUNCTIONAL_ASSESSMENT: 0-10

## 2024-10-08 NOTE — CONSULTS
Reason For Consult  Cedeno's hernia    History Of Present Illness  Yenni Suero is a 44 y.o. female presenting with lower abdominal pain (L>R) since Friday night that has been worsening. Denies any nausea, vomiting, fevers or chills. She endorses constipation, stating her last bowel movement was Friday. Her past abdominal surgical history includes a tubal ligation, hysterectomy, and .     In the ED, she is afebrile and is not tachycardic. WBC 6.3, H/H 14/42.3, lactate 1.6. CT A/P demonstrated a defect in the transversalis fascia medial to the left lower rectus abdominis measuring 1.7 cm. The anti-mesenteric border of the sigmoid colon protrudes through this defect resulting in a Cedeno's hernia - this finding is unchanged compared to 10/12/2018, but may be a potential source of pain. Due to this, general surgery was consulted.      Past Medical History  She has a past medical history of Dorsalgia, unspecified (10/12/2018), Encounter for general adult medical examination without abnormal findings (2019), Endometriosis, and Personal history of other diseases of the female genital tract (2014).    Surgical History  She has a past surgical history that includes Other surgical history (2018) and  section, low transverse.     Social History  She reports that she has never smoked. She has never used smokeless tobacco. She reports current alcohol use. She reports that she does not use drugs.    Family History  Family History   Problem Relation Name Age of Onset    Hypertension Mother      Thyroid disease Mother      Breast cancer Maternal Grandmother      Pancreatic cancer Maternal Grandmother      Cervical cancer Maternal Grandmother          Allergies  Shellfish containing products    Review of Systems  ABD: + pain, constipation. Denies nausea, vomiting, or diarrhea.      Physical Exam  Constitutional: Awake/alert/oriented x3, cooperative.  Skin: Warm and dry.  Eyes: EOMI, clear  "sclera.  ENMT: Mucus membranes moist, no apparent injury.  Head/Neck: Neck supple, no apparent injury.  Respiratory: Unlabored breathing on room air.  Cardiovascular: RRR.  GI: Non distended, soft, moderately tender to palpation of the LLQ and left groin/suprapubic areas. No hernia palpated.   Musculoskeletal: ROM intact, normal strength.  Extremities: TAVERAS.  Neurological: Alert and oriented x3, no focal deficits.  Psychological: Appropriate mood and behavior.     Last Recorded Vitals  Blood pressure 150/69, pulse 77, temperature 36.8 °C (98.2 °F), temperature source Temporal, resp. rate 18, height 1.702 m (5' 7\"), weight 91.6 kg (202 lb), SpO2 100%.    Relevant Results  Results for orders placed or performed during the hospital encounter of 10/07/24 (from the past 24 hour(s))   CBC and Auto Differential   Result Value Ref Range    WBC 6.3 4.4 - 11.3 x10*3/uL    nRBC 0.0 0.0 - 0.0 /100 WBCs    RBC 4.70 4.00 - 5.20 x10*6/uL    Hemoglobin 14.0 12.0 - 16.0 g/dL    Hematocrit 42.3 36.0 - 46.0 %    MCV 90 80 - 100 fL    MCH 29.8 26.0 - 34.0 pg    MCHC 33.1 32.0 - 36.0 g/dL    RDW 11.7 11.5 - 14.5 %    Platelets 370 150 - 450 x10*3/uL    Neutrophils % 42.8 40.0 - 80.0 %    Immature Granulocytes %, Automated 0.2 0.0 - 0.9 %    Lymphocytes % 48.3 13.0 - 44.0 %    Monocytes % 6.5 2.0 - 10.0 %    Eosinophils % 1.6 0.0 - 6.0 %    Basophils % 0.6 0.0 - 2.0 %    Neutrophils Absolute 2.70 1.20 - 7.70 x10*3/uL    Immature Granulocytes Absolute, Automated 0.01 0.00 - 0.70 x10*3/uL    Lymphocytes Absolute 3.04 1.20 - 4.80 x10*3/uL    Monocytes Absolute 0.41 0.10 - 1.00 x10*3/uL    Eosinophils Absolute 0.10 0.00 - 0.70 x10*3/uL    Basophils Absolute 0.04 0.00 - 0.10 x10*3/uL   Lactate   Result Value Ref Range    Lactate 1.6 0.4 - 2.0 mmol/L   Lipase   Result Value Ref Range    Lipase 24 9 - 82 U/L   Comprehensive Metabolic Panel   Result Value Ref Range    Glucose 100 (H) 74 - 99 mg/dL    Sodium 138 136 - 145 mmol/L    Potassium 2.9 " (LL) 3.5 - 5.3 mmol/L    Chloride 100 98 - 107 mmol/L    Bicarbonate 33 (H) 21 - 32 mmol/L    Anion Gap 8 (L) 10 - 20 mmol/L    Urea Nitrogen 9 6 - 23 mg/dL    Creatinine 0.74 0.50 - 1.05 mg/dL    eGFR >90 >60 mL/min/1.73m*2    Calcium 8.9 8.6 - 10.3 mg/dL    Albumin 4.4 3.4 - 5.0 g/dL    Alkaline Phosphatase 82 33 - 110 U/L    Total Protein 7.2 6.4 - 8.2 g/dL     (H) 9 - 39 U/L    Bilirubin, Total 0.5 0.0 - 1.2 mg/dL    ALT 71 (H) 7 - 45 U/L   Human Chorionic Gonadotropin, Serum Quantitative   Result Value Ref Range    HCG, Beta-Quantitative <2 <5 mIU/mL      CT abdomen pelvis w IV contrast    Result Date: 10/7/2024  Interpreted By:  Aniceto Clark, STUDY: CT ABDOMEN PELVIS W IV CONTRAST;  10/7/2024 5:15 pm   INDICATION: Signs/Symptoms:pevic pain and possile kidney stone.   COMPARISON: CT abdomen and pelvis 10/12/2018   ACCESSION NUMBER(S): [id number]   ORDERING CLINICIAN: DAYANNA MICHELE   TECHNIQUE: Axial CT images of the abdomen and pelvis with coronal and sagittal reconstructed images obtained after intravenous administration of contrast.   FINDINGS: LOWER CHEST: Stable nodule in the left lower lobe (8/29) since 2018, therefore likely benign. BONES: Right L5 pars defect. No acute fractures or suspicious osseous lesions ABDOMINAL WALL: Tiny fat containing umbilical hernia. Prior anterior pelvic wall mesh repair. Redemonstration of the Cedeno's hernia of the sigmoid colon as it protrudes into a defect medial to the left rectus abdominis measuring 1.7 cm.   ABDOMEN:   LIVER: Focal fat near the falciform ligament. BILE DUCTS: Unremarkable. GALLBLADDER: Unremarkable. PANCREAS:Unremarkable. SPLEEN: Unremarkable. ADRENALS: Unremarkable. KIDNEYS and URETERS: Left upper pole simple cysts. No nephrolithiasis or hydroureteronephrosis. VESSELS: Unremarkable. LYMPH NODES: Unremarkable. RETROPERITONEUM/PERITONEUM: Unremarkable. BOWEL: Unremarkable.   PELVIS:   REPRODUCTIVE ORGANS: Hysterectomy. Physiologic ovarian cyst  on the right. BLADDER: Unremarkable.       No acute findings in the abdomen or pelvis. No nephrolithiasis or hydroureteronephrosis.   There is a defect in the transversalis fascia medial to the left lower rectus abdominis measuring 1.7 cm. The anti mesenteric border of the sigmoid colon protrudes through this defect resulting in a Cedeno's hernia. This finding is unchanged compared to 10/12/2018 but may be a potential source of pain.   MACRO: None   Signed by: Aniceto Clark 10/7/2024 8:43 PM Dictation workstation:   [id number]       Assessment/Plan  Cedeno's hernia  - Abdomen non distended, soft, moderately tender to palpation of the LLQ and left groin/suprapubic areas. No hernia palpated. Denies N/V.   - Pain control as needed  - PRN antiemetic  - Ok for a diet  - Recommend obtaining a UA and giving a dose of Milk of Magnesia   - After discussion with on-call surgeon, Dr. Muñiz, this does not appear to be acute, could be admitted to medicine for observation or follow up outpatient with Dr. Griffith or Junior.    Dispo: Pain control. UA. Dose of Milk of Magnesia. No current surgical indications at this time. Discussed with Dr. Muñiz.     I spent 35 minutes in the professional and overall care of this patient.      Jannette Domínguez, GENE-CNP

## 2024-10-08 NOTE — TELEPHONE ENCOUNTER
Called patient to discuss appointment set up with Dr. Villagran  Identified by name and   Scheduled patient for est. Care visit on  at 0800 with Dr. Villagran, added to high priority wait list.  Patient verbalized understanding, all questions/concerns addressed at this time.  Message sent to provider regarding records.    NORRIS Blood RN    ----- Message from Bruce Lynn sent at 10/8/2024  8:18 AM EDT -----  Regarding: pt follow up  Hi-- can we help this patient get reestablished with Dr. Villagran?  She has a h/o endometriosis and has seen her previously. Also, for some reason Dr. Villagran's notes from  in [facility] did NOT flow over to [facility]-- can we see if they can bring those over?  Thank you! ES

## 2024-10-08 NOTE — PROGRESS NOTES
10/08/24 0824   [facility] Disability Status   Are you deaf or do you have serious difficulty hearing? N   Are you blind or do you have serious difficulty seeing, even when wearing glasses? N   Because of a physical, mental, or emotional condition, do you have serious difficulty concentrating, remembering, or making decisions? (5 years old or older) N   Do you have serious difficulty walking or climbing stairs? N   Do you have serious difficulty dressing or bathing? N   Because of a physical, mental, or emotional condition, do you have serious difficulty doing errands alone such as visiting the doctor? N

## 2024-10-08 NOTE — PROGRESS NOTES
Transitional Care Coordination Progress Note:  Plan per Medical/Surgical team: treatment of abd pain, UTI with IV morphine, zofran, IV fluids, surgery consult for eval of hernia (no surgery planned)  Status: Observation  Payor source:  employee  Discharge disposition: Home with adult daughter   Potential Barriers: K 2.9  ADOD: 10/8/2024  YAMILEX Fletcher RN, BSN Transitional Care Coordinator ED# [phone]      10/08/24 0824   Discharge Planning   Living Arrangements Children   Support Systems Parent   Assistance Needed ATB plan & pain management, needs PCP   Type of Residence Private residence   Number of Stairs to Enter Residence 0   Number of Stairs Within Residence 0   Do you have animals or pets at home? No   Home or Post Acute Services None   Expected Discharge Disposition Home   Does the patient need discharge transport arranged? No   Financial Resource Strain   How hard is it for you to pay for the very basics like food, housing, medical care, and heating? Not hard   Housing Stability   In the last 12 months, was there a time when you were not able to pay the mortgage or rent on time? N   In the past 12 months, how many times have you moved where you were living? 1   At any time in the past 12 months, were you homeless or living in a shelter (including now)? N   Transportation Needs   In the past 12 months, has lack of transportation kept you from medical appointments or from getting medications? no   In the past 12 months, has lack of transportation kept you from meetings, work, or from getting things needed for daily living? No

## 2024-10-08 NOTE — PROGRESS NOTES
Pharmacy Medication History Review~~~PATIENT REQUESTS REFILLS ON hydrochlorothiazide 12.5MG. FILL HISTORY DOES NOT SHOW COMPLIANCE WITH MEDICATIONS~~~~    Yenni Sueor is a 44 y.o. female admitted for Abdominal pain. Pharmacy reviewed the patient's inxop-jf-svaqfgimh medications and allergies for accuracy.    The list below reflectives the updated PTA list. Please review each medication in order reconciliation for additional clarification and justification.  Prior to Admission Medications   Prescriptions Last Dose Informant     EPINEPHrine (Epipen) 0.3 mg/0.3 mL injection syringe       Sig: Inject 0.3 mL (0.3 mg) into the muscle if needed for anaphylaxis. Call 911 after use.   amLODIPine (Norvasc) 10 mg tablet 10/7/2024      Sig: TAKE 1 TABLET BY MOUTH EVERY DAY   amphetamine-dextroamphetamine (Adderall) 10 mg tablet       Sig: Take 1 tablet (10 mg) by mouth every 12 hours.   buPROPion XL (Wellbutrin XL) 300 mg 24 hr tablet 10/7/2024      Sig: Take 1 tablet (300 mg) by mouth once daily. Do not crush, chew, or split.   busPIRone (Buspar) 7.5 mg tablet 10/7/2024      Sig: Take 1 tablet (7.5 mg) by mouth 2 times a day.   cholecalciferol (Vitamin D-3) 50 mcg (2,000 unit) capsule More than a month      Sig: TAKE 1 CAPSULE BY MOUTH EVERY DAY   Patient not taking: Reported on 10/8/2024   hydroCHLOROthiazide (HYDRODiuril) 12.5 mg tablet More than a month      Sig: TAKE 1 TABLET BY MOUTH EVERY DAY   Patient not taking: Reported on 10/8/2024      Facility-Administered Medications: None      Allergies  Reviewed by Sandhya Dubois on 10/8/2024        Severity Reactions Comments    Shellfish Containing Products High Anaphylaxis, Unknown             Below are additional concerns with the patient's PTA list.  The following updates were made to the Prior to Admission medication list:     Source of Information:     Medications ADDED:   ADDERALL 10 MG  BUSPIRONE 7.5 MG  Medications CHANGED:  N/A  Medications REMOVED:    N/A  Medications NOT TAKING:   N/A    Allergy reviewed : Yes    Comments: Patient verified all medications and doses.      Sandhya Dubois

## 2024-10-08 NOTE — ED PROVIDER NOTES
HPI   Chief Complaint   Patient presents with    Abdominal Pain       This a 44-year-old woman with past medical history of endometriosis prior hysterectomy who does present with complaint of lower abdominal pain for the last 1 to 2 days.  Progressively worsening.  Has tried tramadol and ibuprofen minimal improvement.  She does report initial symptoms started 48 hours ago with progressive worsening.  Denies any fever or chills.  Denies any vomiting.  Denies any dysuria but does report some hematuria            Patient History   Past Medical History:   Diagnosis Date    Dorsalgia, unspecified 10/12/2018    Costovertebral angle tenderness    Encounter for general adult medical examination without abnormal findings 2019    Routine history and physical examination of adult    Endometriosis     Personal history of other diseases of the female genital tract 2014    History of endometriosis     Past Surgical History:   Procedure Laterality Date     SECTION, LOW TRANSVERSE      OTHER SURGICAL HISTORY  2018    Laparoscopic hysterectomy     Family History   Problem Relation Name Age of Onset    Hypertension Mother      Thyroid disease Mother      Breast cancer Maternal Grandmother      Pancreatic cancer Maternal Grandmother      Cervical cancer Maternal Grandmother       Social History     Tobacco Use    Smoking status: Never    Smokeless tobacco: Never   Vaping Use    Vaping status: Never Used   Substance Use Topics    Alcohol use: Yes     Comment: social    Drug use: Never       Physical Exam   ED Triage Vitals [10/07/24 1809]   Temperature Heart Rate Respirations BP   36.8 °C (98.2 °F) 74 18 (!) 150/102      Pulse Ox Temp Source Heart Rate Source Patient Position   99 % Temporal Monitor Sitting      BP Location FiO2 (%)     Right arm --       Physical Exam  Vitals and nursing note reviewed.   Constitutional:       Appearance: She is well-developed. She is obese.   HENT:      Head: Normocephalic  and atraumatic.   Eyes:      Extraocular Movements: Extraocular movements intact.      Pupils: Pupils are equal, round, and reactive to light.   Cardiovascular:      Rate and Rhythm: Normal rate and regular rhythm.      Heart sounds: Normal heart sounds.   Pulmonary:      Effort: Pulmonary effort is normal.      Breath sounds: Normal breath sounds.   Abdominal:      General: Abdomen is flat. Bowel sounds are normal.      Tenderness: There is abdominal tenderness in the right lower quadrant, suprapubic area and left lower quadrant. There is no right CVA tenderness, left CVA tenderness, guarding or rebound. Negative signs include Nazario's sign.   Skin:     General: Skin is warm and dry.      Capillary Refill: Capillary refill takes less than 2 seconds.      Coloration: Skin is not jaundiced, mottled or pale.   Neurological:      General: No focal deficit present.      Mental Status: She is alert and oriented to person, place, and time.   Psychiatric:         Mood and Affect: Mood normal.         Behavior: Behavior normal.           ED Course & MDM   Diagnoses as of 10/07/24 2112   Lower abdominal pain   Hernia                 No data recorded     [place] Coma Scale Score: 15 (10/07/24 1810 : Janice Sandhu RN)                           Medical Decision Making  IV is placed and labs drawn including CBC, CMP, lipase, lactic acid.  Lactic acid noted 1.6.  Protective test was negative.  No elevated blood cell count is doing infection.  There was no acute kidney injury.  Your potassium was noted 2.9 this is repleted.  Patient received morphine and Zofran for pain control.  Her CT scan which was done earlier today I did call the radiologist to provide a read and this is notable for concern for Cedeno's hernia with sigmoid colon protruding through the defect.  The patient does remain persistently tender to consult general surgery for further evaluation and recommendations.  Reviewed with general surgery and plan for treating  the constipation with milk of magnesia and admit to obs with surgical reassessment.    Amount and/or Complexity of Data Reviewed  Labs: ordered. Decision-making details documented in ED Course.  Radiology: ordered. Decision-making details documented in ED Course.        Procedure  Procedures     Toño Priest MD  10/08/24 0026

## 2024-10-09 ENCOUNTER — PATIENT OUTREACH (OUTPATIENT)
Dept: CARE COORDINATION | Facility: CLINIC | Age: 44
End: 2024-10-09
Payer: COMMERCIAL

## 2024-10-09 NOTE — H&P
"History Of Present Illness  Yenni Suero is a 44 y.o. female presenting with left lower quadrant pain.  Past Medical History:   Diagnosis Date    Dorsalgia, unspecified 10/12/2018    Costovertebral angle tenderness    Encounter for general adult medical examination without abnormal findings 2019    Routine history and physical examination of adult    Endometriosis     Personal history of other diseases of the female genital tract 2014    History of endometriosis     She has chronic pain in LLQ, which comes and goes. She does have history of constipation. Over the past few days, they pain got worse and more persistent. She did have a BM just before I saw her. CT scan shows Cedeno hernia, which was also seen back in 2018 CT scan.     Past Surgical History:   Procedure Laterality Date     SECTION, LOW TRANSVERSE      OTHER SURGICAL HISTORY  2018    Laparoscopic hysterectomy     Social History     Tobacco Use    Smoking status: Never    Smokeless tobacco: Never   Vaping Use    Vaping status: Never Used   Substance Use Topics    Alcohol use: Yes     Comment: social    Drug use: Never     Family History   Problem Relation Name Age of Onset    Hypertension Mother      Thyroid disease Mother      Breast cancer Maternal Grandmother      Pancreatic cancer Maternal Grandmother      Cervical cancer Maternal Grandmother       Allergies  Shellfish containing products    Review of Systems   Constitutional: Negative.    HENT: Negative.     Respiratory: Negative.     Cardiovascular: Negative.    Gastrointestinal:  Positive for abdominal pain and constipation.   Genitourinary: Negative.    Musculoskeletal: Negative.    Skin: Negative.    Neurological: Negative.    Psychiatric/Behavioral: Negative.         Last Recorded Vitals  Blood pressure 124/77, pulse 76, temperature 36.7 °C (98 °F), temperature source Oral, resp. rate 16, height 1.702 m (5' 7\"), weight 91.6 kg (202 lb), SpO2 97%.  Physical " Exam  Cardiovascular:      Rate and Rhythm: Normal rate and regular rhythm.      Heart sounds: Normal heart sounds.   Pulmonary:      Breath sounds: Normal breath sounds.   Abdominal:      General: Bowel sounds are normal.      Palpations: Abdomen is soft.      Tenderness: There is abdominal tenderness in the left lower quadrant.   Musculoskeletal:         General: Normal range of motion.   Neurological:      General: No focal deficit present.      Mental Status: She is alert and oriented to person, place, and time.   Psychiatric:         Mood and Affect: Mood normal.           Relevant Results           Assessment/Plan   Assessment & Plan  Abdominal pain    Hernia    - her pain may be due to this hernia; defect appears similar, but she could be feeling pain when herniated bowel has gas going through it  - surgery team did not feel urgent surgery was warranted; will refer for outpt follow up  - pain improved with toradol; will give short course of this; advised she take miralax as well  - discharged home         I spent 50 minutes in the professional and overall care of this patient.      Brody Rivera MD

## 2024-10-09 NOTE — PROGRESS NOTES
[facility] ED outreach. Left message requesting return call.   Lower abd pain. Refer to general surgery. Apt 10/11/24.  Sayda THOMPSON, [facility]  Office Phone: [phone]

## 2024-10-11 ENCOUNTER — OFFICE VISIT (OUTPATIENT)
Dept: SURGERY | Facility: CLINIC | Age: 44
End: 2024-10-11
Payer: COMMERCIAL

## 2024-10-11 VITALS
DIASTOLIC BLOOD PRESSURE: 90 MMHG | TEMPERATURE: 97 F | HEART RATE: 82 BPM | SYSTOLIC BLOOD PRESSURE: 141 MMHG | BODY MASS INDEX: 31.95 KG/M2 | WEIGHT: 204 LBS

## 2024-10-11 DIAGNOSIS — K46.9 HERNIA: ICD-10-CM

## 2024-10-11 PROCEDURE — 3077F SYST BP >= 140 MM HG: CPT | Performed by: SURGERY

## 2024-10-11 PROCEDURE — 1036F TOBACCO NON-USER: CPT | Performed by: SURGERY

## 2024-10-11 PROCEDURE — 99213 OFFICE O/P EST LOW 20 MIN: CPT | Performed by: SURGERY

## 2024-10-11 PROCEDURE — 99203 OFFICE O/P NEW LOW 30 MIN: CPT | Performed by: SURGERY

## 2024-10-11 PROCEDURE — 3080F DIAST BP >= 90 MM HG: CPT | Performed by: SURGERY

## 2024-10-11 ASSESSMENT — ENCOUNTER SYMPTOMS
DEPRESSION: 0
CHILLS: 0
ABDOMINAL PAIN: 1
DIARRHEA: 0
VOMITING: 0
DIAPHORESIS: 0
CONSTIPATION: 1
NAUSEA: 0
UNEXPECTED WEIGHT CHANGE: 0
FATIGUE: 0
FEVER: 0

## 2024-10-11 ASSESSMENT — PAIN SCALES - GENERAL: PAINLEVEL: 4

## 2024-10-11 NOTE — PROGRESS NOTES
Subjective   Patient ID: Yenni Suero is a 44 y.o. female who presents for No chief complaint on file..  HPI  Is a 44-year-old female with history of endometriosis and multiple abdominal operations who recently presents emergency department with severe pain localized to her left lower abdomen and left groin region.  She did undergo a CT scan that shows a 1.7 cm Cedeno's type hernia in the lower midline abdominal wall.    She states that she has had ongoing chronic lower abdominal pain for many years that in the past was related to endometriosis.  She has undergone laparoscopic fulguration of endometriosis in the past.    Regarding her current pain is made worse with exertion such as strenuous workouts.  She has some nausea but no vomiting.  Pain is described as burning and sharp and she points to her left inguinal region as to the site of the pain.  She has not noted a bulge per se.    The patient states that she cannot distinguish this pain from her endometriosis. She feels this pain is worse when she is working out. She admits to longstanding constipation. Denies n/v/d.     Patient has never smoked tobacco or used smokeless tobacco. Drinks alcohol 2 weekends/month.    Allergies: shellfish     Referred from ED, Dr. Rivera.    Review of Systems   Constitutional:  Negative for chills, diaphoresis, fatigue, fever and unexpected weight change.   Gastrointestinal:  Positive for abdominal pain and constipation. Negative for diarrhea, nausea and vomiting.       Past Medical History:   Diagnosis Date    Dorsalgia, unspecified 10/12/2018    Costovertebral angle tenderness    Encounter for general adult medical examination without abnormal findings 2019    Routine history and physical examination of adult    Endometriosis     Personal history of other diseases of the female genital tract 2014    History of endometriosis    HTN    Past Surgical History:   Procedure Laterality Date     SECTION, LOW  TRANSVERSE      OTHER SURGICAL HISTORY  2018    Laparoscopic hysterectomy    Tubal ligation, liposuction, exploratory laparoscopy (endometriosis)  Hysterectomy,       Objective   CT ABDOMEN PELVIS W IV CONTRAST; 10/7/2024  IMPRESSION:  No acute findings in the abdomen or pelvis. No nephrolithiasis or  hydroureteronephrosis.      There is a defect in the transversalis fascia medial to the left  lower rectus abdominis measuring 1.7 cm. The anti mesenteric border  of the sigmoid colon protrudes through this defect resulting in a  Cedeno's hernia. This finding is unchanged compared to 10/12/2018  but may be a potential source of pain.    Physical Exam  Constitutional:       General: She is not in acute distress.     Appearance: Normal appearance. She is not ill-appearing.   Abdominal:      General: Abdomen is flat. There is no distension.      Palpations: Abdomen is soft. There is no mass.      Tenderness: There is abdominal tenderness. There is no guarding or rebound.   Neurological:      Mental Status: She is alert.   Psychiatric:         Mood and Affect: Mood normal.         Behavior: Behavior normal.     No obvious ventral  hernia defects.  No right or left (some tenderness) inguinal hernia.  Points to left groin as where she is having pain/tenderness     Assessment/Plan     Patient with longstanding complaint of pain lower abdomen, especially left groin.  CT scan shows very small 1.7 cm defect lower Abdominal wall just to the left of midline    There is discordance between the site of her symptoms and the hernia seen on physical exam.  It appears that her symptoms are out of proportion to the findings of a small hernia    She may benefit from diagnostic laparoscopy, repair of the small hernia.  Is quite possible her symptoms may also be related to endometriosis or adhesions from previous abdominal surgeries

## 2024-10-11 NOTE — LETTER
October 11, 2024     Peyton Dobson MD  [id number]  [facility] [address]    Patient: Yenni Suero   YOB: 1980   Date of Visit: 10/11/2024       Dear Dr. Peyton Dobson MD:    Thank you for referring Yenni Suero to me for evaluation. Below are my notes for this consultation.  If you have questions, please do not hesitate to call me. I look forward to following your patient along with you.       Sincerely,     Gonzalez Pacheco MD      CC: No Recipients  ______________________________________________________________________________________    Subjective  Patient ID: Yenni Suero is a 44 y.o. female who presents for No chief complaint on file..  HPI  Is a 44-year-old female with history of endometriosis and multiple abdominal operations who recently presents emergency department with severe pain localized to her left lower abdomen and left groin region.  She did undergo a CT scan that shows a 1.7 cm Cedeno's type hernia in the lower midline abdominal wall.    She states that she has had ongoing chronic lower abdominal pain for many years that in the past was related to endometriosis.  She has undergone laparoscopic fulguration of endometriosis in the past.    Regarding her current pain is made worse with exertion such as strenuous workouts.  She has some nausea but no vomiting.  Pain is described as burning and sharp and she points to her left inguinal region as to the site of the pain.  She has not noted a bulge per se.    The patient states that she cannot distinguish this pain from her endometriosis. She feels this pain is worse when she is working out. She admits to longstanding constipation. Denies n/v/d.     Patient has never smoked tobacco or used smokeless tobacco. Drinks alcohol 2 weekends/month.    Allergies: shellfish     Referred from ED, Dr. Rivera.    Review of Systems   Constitutional:  Negative for chills, diaphoresis, fatigue, fever and unexpected weight change.    Gastrointestinal:  Positive for abdominal pain and constipation. Negative for diarrhea, nausea and vomiting.       Past Medical History:   Diagnosis Date   • Dorsalgia, unspecified 10/12/2018    Costovertebral angle tenderness   • Encounter for general adult medical examination without abnormal findings 2019    Routine history and physical examination of adult   • Endometriosis    • Personal history of other diseases of the female genital tract 2014    History of endometriosis    HTN    Past Surgical History:   Procedure Laterality Date   •  SECTION, LOW TRANSVERSE     • OTHER SURGICAL HISTORY  2018    Laparoscopic hysterectomy    Tubal ligation, liposuction, exploratory laparoscopy (endometriosis)  Hysterectomy,       Objective  CT ABDOMEN PELVIS W IV CONTRAST; 10/7/2024  IMPRESSION:  No acute findings in the abdomen or pelvis. No nephrolithiasis or  hydroureteronephrosis.      There is a defect in the transversalis fascia medial to the left  lower rectus abdominis measuring 1.7 cm. The anti mesenteric border  of the sigmoid colon protrudes through this defect resulting in a  Cedeno's hernia. This finding is unchanged compared to 10/12/2018  but may be a potential source of pain.    Physical Exam  Constitutional:       General: She is not in acute distress.     Appearance: Normal appearance. She is not ill-appearing.   Abdominal:      General: Abdomen is flat. There is no distension.      Palpations: Abdomen is soft. There is no mass.      Tenderness: There is abdominal tenderness. There is no guarding or rebound.   Neurological:      Mental Status: She is alert.   Psychiatric:         Mood and Affect: Mood normal.         Behavior: Behavior normal.     No obvious ventral  hernia defects.  No right or left (some tenderness) inguinal hernia.  Points to left groin as where she is having pain/tenderness     Assessment/Plan    Patient with longstanding complaint of pain lower  abdomen, especially left groin.  CT scan shows very small 1.7 cm defect lower Abdominal wall just to the left of midline    There is discordance between the site of her symptoms and the hernia seen on physical exam.  It appears that her symptoms are out of proportion to the findings of a small hernia    She may benefit from diagnostic laparoscopy, repair of the small hernia.  Is quite possible her symptoms may also be related to endometriosis or adhesions from previous abdominal surgeries

## 2024-10-17 ENCOUNTER — PATIENT OUTREACH (OUTPATIENT)
Dept: CARE COORDINATION | Facility: CLINIC | Age: 44
End: 2024-10-17
Payer: COMMERCIAL

## 2024-10-17 NOTE — PROGRESS NOTES
[facility] outreach. Left message requesting return call.   Sayda THOMPSON, [facility]  Office Phone: [phone]

## 2024-10-30 ENCOUNTER — LAB (OUTPATIENT)
Dept: LAB | Facility: LAB | Age: 44
End: 2024-10-30
Payer: COMMERCIAL

## 2024-10-30 ENCOUNTER — HOSPITAL ENCOUNTER (OUTPATIENT)
Dept: RADIOLOGY | Facility: CLINIC | Age: 44
Discharge: HOME | End: 2024-10-30
Payer: COMMERCIAL

## 2024-10-30 VITALS — WEIGHT: 199 LBS | BODY MASS INDEX: 31.23 KG/M2 | HEIGHT: 67 IN

## 2024-10-30 DIAGNOSIS — Z00.00 ENCOUNTER FOR GENERAL ADULT MEDICAL EXAMINATION WITHOUT ABNORMAL FINDINGS: Primary | ICD-10-CM

## 2024-10-30 DIAGNOSIS — Z12.31 ENCOUNTER FOR SCREENING MAMMOGRAM FOR MALIGNANT NEOPLASM OF BREAST: ICD-10-CM

## 2024-10-30 LAB
25(OH)D3 SERPL-MCNC: 34 NG/ML (ref 30–100)
ALBUMIN SERPL BCP-MCNC: 4.2 G/DL (ref 3.4–5)
ALP SERPL-CCNC: 88 U/L (ref 33–110)
ALT SERPL W P-5'-P-CCNC: 14 U/L (ref 7–45)
ANION GAP SERPL CALC-SCNC: 10 MMOL/L (ref 10–20)
AST SERPL W P-5'-P-CCNC: 16 U/L (ref 9–39)
BILIRUB SERPL-MCNC: 0.7 MG/DL (ref 0–1.2)
BUN SERPL-MCNC: 13 MG/DL (ref 6–23)
CALCIUM SERPL-MCNC: 9.6 MG/DL (ref 8.6–10.6)
CHLORIDE SERPL-SCNC: 107 MMOL/L (ref 98–107)
CHOLEST SERPL-MCNC: 141 MG/DL (ref 0–199)
CHOLESTEROL/HDL RATIO: 2.9
CO2 SERPL-SCNC: 30 MMOL/L (ref 21–32)
CREAT SERPL-MCNC: 0.75 MG/DL (ref 0.5–1.05)
EGFRCR SERPLBLD CKD-EPI 2021: >90 ML/MIN/1.73M*2
ERYTHROCYTE [DISTWIDTH] IN BLOOD BY AUTOMATED COUNT: 11.4 % (ref 11.5–14.5)
EST. AVERAGE GLUCOSE BLD GHB EST-MCNC: 114 MG/DL
GLUCOSE SERPL-MCNC: 103 MG/DL (ref 74–99)
HBA1C MFR BLD: 5.6 %
HCT VFR BLD AUTO: 41.4 % (ref 36–46)
HDLC SERPL-MCNC: 49.2 MG/DL
HGB BLD-MCNC: 13.8 G/DL (ref 12–16)
LDLC SERPL CALC-MCNC: 75 MG/DL
MCH RBC QN AUTO: 29.9 PG (ref 26–34)
MCHC RBC AUTO-ENTMCNC: 33.3 G/DL (ref 32–36)
MCV RBC AUTO: 90 FL (ref 80–100)
NON HDL CHOLESTEROL: 92 MG/DL (ref 0–149)
NRBC BLD-RTO: 0 /100 WBCS (ref 0–0)
PLATELET # BLD AUTO: 397 X10*3/UL (ref 150–450)
POTASSIUM SERPL-SCNC: 3.4 MMOL/L (ref 3.5–5.3)
PROT SERPL-MCNC: 7.3 G/DL (ref 6.4–8.2)
RBC # BLD AUTO: 4.61 X10*6/UL (ref 4–5.2)
SODIUM SERPL-SCNC: 144 MMOL/L (ref 136–145)
TRIGL SERPL-MCNC: 84 MG/DL (ref 0–149)
TSH SERPL-ACNC: 0.4 MIU/L (ref 0.44–3.98)
VIT B12 SERPL-MCNC: 1423 PG/ML (ref 211–911)
VLDL: 17 MG/DL (ref 0–40)
WBC # BLD AUTO: 6.6 X10*3/UL (ref 4.4–11.3)

## 2024-10-30 PROCEDURE — 84443 ASSAY THYROID STIM HORMONE: CPT

## 2024-10-30 PROCEDURE — 82607 VITAMIN B-12: CPT

## 2024-10-30 PROCEDURE — 83036 HEMOGLOBIN GLYCOSYLATED A1C: CPT

## 2024-10-30 PROCEDURE — 36415 COLL VENOUS BLD VENIPUNCTURE: CPT

## 2024-10-30 PROCEDURE — 82306 VITAMIN D 25 HYDROXY: CPT

## 2024-10-30 PROCEDURE — 77063 BREAST TOMOSYNTHESIS BI: CPT | Performed by: RADIOLOGY

## 2024-10-30 PROCEDURE — 80061 LIPID PANEL: CPT

## 2024-10-30 PROCEDURE — 77067 SCR MAMMO BI INCL CAD: CPT

## 2024-10-30 PROCEDURE — 77067 SCR MAMMO BI INCL CAD: CPT | Performed by: RADIOLOGY

## 2024-10-30 PROCEDURE — 85027 COMPLETE CBC AUTOMATED: CPT

## 2024-10-30 PROCEDURE — 80053 COMPREHEN METABOLIC PANEL: CPT

## 2024-11-27 ENCOUNTER — APPOINTMENT (OUTPATIENT)
Dept: OBSTETRICS AND GYNECOLOGY | Facility: CLINIC | Age: 44
End: 2024-11-27
Payer: COMMERCIAL

## 2024-12-05 ENCOUNTER — LAB (OUTPATIENT)
Dept: LAB | Facility: LAB | Age: 44
End: 2024-12-05
Payer: COMMERCIAL

## 2024-12-05 DIAGNOSIS — R79.89 OTHER SPECIFIED ABNORMAL FINDINGS OF BLOOD CHEMISTRY: ICD-10-CM

## 2024-12-05 DIAGNOSIS — R79.89 OTHER SPECIFIED ABNORMAL FINDINGS OF BLOOD CHEMISTRY: Primary | ICD-10-CM

## 2024-12-05 LAB
T3 SERPL-MCNC: 120 NG/DL (ref 60–200)
T4 FREE SERPL-MCNC: 1.11 NG/DL (ref 0.78–1.48)
TSH SERPL-ACNC: 0.59 MIU/L (ref 0.44–3.98)

## 2024-12-05 PROCEDURE — 84480 ASSAY TRIIODOTHYRONINE (T3): CPT

## 2024-12-05 PROCEDURE — 84439 ASSAY OF FREE THYROXINE: CPT

## 2024-12-05 PROCEDURE — 36415 COLL VENOUS BLD VENIPUNCTURE: CPT

## 2024-12-05 PROCEDURE — 84443 ASSAY THYROID STIM HORMONE: CPT

## 2025-01-22 ENCOUNTER — APPOINTMENT (OUTPATIENT)
Dept: OBSTETRICS AND GYNECOLOGY | Facility: CLINIC | Age: 45
End: 2025-01-22
Payer: COMMERCIAL

## 2025-04-08 ENCOUNTER — APPOINTMENT (OUTPATIENT)
Dept: PRIMARY CARE | Facility: CLINIC | Age: 45
End: 2025-04-08
Payer: COMMERCIAL

## 2025-06-13 ENCOUNTER — OFFICE VISIT (OUTPATIENT)
Dept: URGENT CARE | Age: 45
End: 2025-06-13
Payer: COMMERCIAL

## 2025-06-13 VITALS
SYSTOLIC BLOOD PRESSURE: 150 MMHG | HEART RATE: 83 BPM | RESPIRATION RATE: 16 BRPM | BODY MASS INDEX: 30.29 KG/M2 | DIASTOLIC BLOOD PRESSURE: 95 MMHG | HEIGHT: 67 IN | OXYGEN SATURATION: 98 % | WEIGHT: 193 LBS | TEMPERATURE: 97.8 F

## 2025-06-13 DIAGNOSIS — H10.31 ACUTE BACTERIAL CONJUNCTIVITIS OF RIGHT EYE: Primary | ICD-10-CM

## 2025-06-13 RX ORDER — POLYMYXIN B SULFATE AND TRIMETHOPRIM 1; 10000 MG/ML; [USP'U]/ML
1 SOLUTION OPHTHALMIC EVERY 4 HOURS
Qty: 10 ML | Refills: 0 | Status: SHIPPED | OUTPATIENT
Start: 2025-06-13 | End: 2025-06-23

## 2025-06-13 ASSESSMENT — ENCOUNTER SYMPTOMS
OCCASIONAL FEELINGS OF UNSTEADINESS: 0
LOSS OF SENSATION IN FEET: 0
DEPRESSION: 0
EYE REDNESS: 1
EYE DISCHARGE: 1
EYE ITCHING: 1
EYE PAIN: 1

## 2025-06-13 ASSESSMENT — PAIN SCALES - GENERAL: PAINLEVEL_OUTOF10: 4

## 2025-06-13 NOTE — PROGRESS NOTES
"Subjective   Patient ID: Yenni Suero is a 45 y.o. female. They present today with a chief complaint of Eye Problem (Right eye is red watery and had pain 1 week).    History of Present Illness  Patient is a 45-year-old female presents today with complaint of the right eyelid redness, pain, and discharge for the last week.  She reports crusty discharge every morning which keeps the eyes shut.  She reports irritation and sensitivity as she keeps rubbing her eyes in the past week.  She has tried over-the-counter popper day eyedrops for allergies without any relief.  She denies any symptoms of upper respiratory infection at this time.  She denies any visual changes or headache or pain behind the eye as well as denying swelling around the eye.  Patient denies using contact lenses and has glasses she uses daily.    Past Medical History  Allergies as of 06/13/2025 - Reviewed 06/13/2025   Allergen Reaction Noted    Shellfish containing products Anaphylaxis and Unknown 02/12/2023       Prescriptions Prior to Admission[1]     Medical History[2]    Surgical History[3]     reports that she has never smoked. She has never used smokeless tobacco. She reports current alcohol use. She reports that she does not use drugs.    Review of Systems  Review of Systems   Eyes:  Positive for pain, discharge, redness and itching.   All other systems reviewed and are negative.                                 Objective    Vitals:    06/13/25 1231   BP: (!) 150/95   BP Location: Right arm   Patient Position: Sitting   BP Cuff Size: Small adult   Pulse: 83   Resp: 16   Temp: 36.6 °C (97.8 °F)   TempSrc: Oral   SpO2: 98%   Weight: 87.5 kg (193 lb)   Height: 1.702 m (5' 7\")     No LMP recorded. Patient has had a hysterectomy.    Physical Exam  Vitals reviewed.   General: Vitals Noted. No distress. Normocephalic.  Cardiovascular:     Heart sounds: Normal heart sounds, S1 normal and S2 normal. No murmur heard.     No friction rub.   Pulmonary: "      Effort: Pulmonary effort is normal.      Breath sounds:  Lungs clear to auscultation bilaterally   HEENT: Left and right TM is within normal limits. No drainage.  EOMI, normal conjunctiva to the left with erythematous conjunctiva to the right eye, Normal OP,  No maxillary and frontal sinus tenderness on palpation is present. Pharynx and tonsils are not hyperemic, and without exudate.   Neck: Supple with no adenopathy.  Lymphadenopathy:   No cervical adenopathy on palpation  Lower Body: No right inguinal adenopathy. No left inguinal adenopathy.   Abdominal:      Palpations: There is no hepatomegaly, splenomegaly or mass. Abdomen is soft, non-tender, and non-distended. No suprapubic tenderness. No CVA tenderness.   Skin:     Comments: no rash   Neurological:      Cranial Nerves: Cranial nerves 2-12 are intact.      Sensory: No sensory deficit.      Motor: Motor function is intact.      Deep Tendon Reflexes: Reflexes are normal and symmetric.       Procedures    Point of Care Test & Imaging Results from this visit  No results found for this visit on 06/13/25.   Imaging  No results found.    Cardiology, Vascular, and Other Imaging  No other imaging results found for the past 2 days      Diagnostic study results (if any) were reviewed by RACHEL Ordoñez.    Assessment/Plan   Allergies, medications, history, and pertinent labs/EKGs/Imaging reviewed by RACHEL Ordoñez.     Medical Decision Making  Patient is alert and oriented x 3 no acute distress.  Presents with concerns for the right eye redness with pain and discharge for the last week.  No visual changes on examination with normal red reflexes bilaterally. Concerns for bacterial conjunctivitis vs periorbital cellulitis vs viral conjunctivitis. In the setting of absence of symptoms of URI and positive for purulent crusty discharge on examination, patient treated for bacterial conjunctivitis.  Antibiotic Eye drop as prescribed.  Instructed to follow  up with ophthalmology if new or further worsening visual deficit/problem take place. Patient verbalized understanding.       Orders and Diagnoses  Diagnoses and all orders for this visit:  Acute bacterial conjunctivitis of right eye  -     polymyxin B sulf-trimethoprim (Polytrim) ophthalmic solution; Administer 1 drop into the right eye every 4 hours for 10 days.      Medical Admin Record      Patient disposition: Home    Electronically signed by RACHEL Ordoñez  1:05 PM           [1] (Not in a hospital admission)   [2]   Past Medical History:  Diagnosis Date    Dorsalgia, unspecified 10/12/2018    Costovertebral angle tenderness    Encounter for general adult medical examination without abnormal findings 2019    Routine history and physical examination of adult    Endometriosis     Personal history of other diseases of the female genital tract 2014    History of endometriosis   [3]   Past Surgical History:  Procedure Laterality Date     SECTION, LOW TRANSVERSE      OTHER SURGICAL HISTORY  2018    Laparoscopic hysterectomy

## 2025-06-13 NOTE — LETTER
June 13, 2025     Patient: Yenni Suero   YOB: 1980   Date of Visit: 6/13/2025       To Whom It May Concern:    It is my medical opinion that Yenni Suero should remain out of work until 06/15/25.    If you have any questions or concerns, please don't hesitate to call.         Sincerely,        Carol Sultana, GENE-CNP    CC: No Recipients

## 2025-09-04 ENCOUNTER — OFFICE VISIT (OUTPATIENT)
Dept: URGENT CARE | Age: 45
End: 2025-09-04
Payer: COMMERCIAL

## 2025-09-04 VITALS
TEMPERATURE: 97.8 F | SYSTOLIC BLOOD PRESSURE: 148 MMHG | DIASTOLIC BLOOD PRESSURE: 81 MMHG | RESPIRATION RATE: 19 BRPM | OXYGEN SATURATION: 98 %

## 2025-09-04 DIAGNOSIS — R10.2 PELVIC PAIN: ICD-10-CM

## 2025-09-04 DIAGNOSIS — N89.8 VAGINAL DISCHARGE: ICD-10-CM

## 2025-09-04 DIAGNOSIS — J01.10 ACUTE NON-RECURRENT FRONTAL SINUSITIS: ICD-10-CM

## 2025-09-04 DIAGNOSIS — N30.01 ACUTE CYSTITIS WITH HEMATURIA: Primary | ICD-10-CM

## 2025-09-04 DIAGNOSIS — M54.9 BACK PAIN, UNSPECIFIED BACK LOCATION, UNSPECIFIED BACK PAIN LATERALITY, UNSPECIFIED CHRONICITY: ICD-10-CM

## 2025-09-04 LAB
POC APPEARANCE, URINE: ABNORMAL
POC BACTERIAL VAGINITIS (RAPID): NEGATIVE
POC BILIRUBIN, URINE: NEGATIVE
POC BLOOD, URINE: ABNORMAL
POC COLOR, URINE: YELLOW
POC GLUCOSE, URINE: NEGATIVE MG/DL
POC KETONES, URINE: NEGATIVE MG/DL
POC LEUKOCYTES, URINE: ABNORMAL
POC NITRITE,URINE: NEGATIVE
POC PH, URINE: 6.5 PH
POC PROTEIN, URINE: NEGATIVE MG/DL
POC SPECIFIC GRAVITY, URINE: 1.01
POC TRICHOMONAS: NEGATIVE
POC UROBILINOGEN, URINE: 0.2 EU/DL

## 2025-09-04 RX ORDER — TIRZEPATIDE 7.5 MG/.5ML
INJECTION, SOLUTION SUBCUTANEOUS
COMMUNITY
Start: 2025-08-05

## 2025-09-04 RX ORDER — AMOXICILLIN AND CLAVULANATE POTASSIUM 875; 125 MG/1; MG/1
875 TABLET, FILM COATED ORAL 2 TIMES DAILY
Qty: 20 TABLET | Refills: 0 | Status: SHIPPED | OUTPATIENT
Start: 2025-09-04

## 2025-09-04 RX ORDER — FLUCONAZOLE 150 MG/1
150 TABLET ORAL ONCE
Qty: 1 TABLET | Refills: 0 | Status: SHIPPED | OUTPATIENT
Start: 2025-09-04 | End: 2025-09-04

## 2025-09-04 RX ORDER — LISDEXAMFETAMINE DIMESYLATE 60 MG/1
60 CAPSULE ORAL
COMMUNITY
Start: 2025-08-13

## 2025-09-04 RX ORDER — HYDROCHLOROTHIAZIDE 12.5 MG/1
1 TABLET ORAL
COMMUNITY
Start: 2025-05-20

## 2025-09-04 ASSESSMENT — ENCOUNTER SYMPTOMS
CHEST TIGHTNESS: 0
WHEEZING: 0
ABDOMINAL PAIN: 1
BLOOD IN STOOL: 0
HEMATURIA: 0
COUGH: 1
BACK PAIN: 1
DYSURIA: 0
DIARRHEA: 0
VOMITING: 0
SHORTNESS OF BREATH: 0
SINUS PAIN: 1
SORE THROAT: 1
FREQUENCY: 0
NAUSEA: 1
SINUS PRESSURE: 1
FEVER: 0
CHILLS: 0
CONSTIPATION: 0
RHINORRHEA: 1

## 2025-09-04 ASSESSMENT — PAIN SCALES - GENERAL: PAINLEVEL_OUTOF10: 9

## 2025-09-05 LAB
BV SCORE VAG QL: NORMAL
C TRACH RRNA SPEC QL NAA+PROBE: NOT DETECTED
N GONORRHOEA RRNA SPEC QL NAA+PROBE: NOT DETECTED
QUEST GC CT AMPLIFIED (ALWAYS MESSAGE): NORMAL

## 2025-09-07 LAB — BACTERIA UR CULT: ABNORMAL
